# Patient Record
Sex: FEMALE | Race: WHITE | Employment: UNEMPLOYED | URBAN - METROPOLITAN AREA
[De-identification: names, ages, dates, MRNs, and addresses within clinical notes are randomized per-mention and may not be internally consistent; named-entity substitution may affect disease eponyms.]

---

## 2018-09-21 ENCOUNTER — OFFICE VISIT (OUTPATIENT)
Dept: FAMILY MEDICINE CLINIC | Age: 38
End: 2018-09-21

## 2018-09-21 VITALS
HEART RATE: 110 BPM | SYSTOLIC BLOOD PRESSURE: 104 MMHG | BODY MASS INDEX: 35.71 KG/M2 | TEMPERATURE: 98.3 F | OXYGEN SATURATION: 98 % | HEIGHT: 66 IN | DIASTOLIC BLOOD PRESSURE: 72 MMHG | WEIGHT: 222.2 LBS

## 2018-09-21 DIAGNOSIS — Z23 NEED FOR DIPHTHERIA-TETANUS-PERTUSSIS (TDAP) VACCINE: ICD-10-CM

## 2018-09-21 DIAGNOSIS — N89.8 VAGINAL IRRITATION: ICD-10-CM

## 2018-09-21 DIAGNOSIS — L65.8 FEMALE PATTERN HAIR LOSS: ICD-10-CM

## 2018-09-21 DIAGNOSIS — Z11.4 SCREENING FOR HIV (HUMAN IMMUNODEFICIENCY VIRUS): ICD-10-CM

## 2018-09-21 DIAGNOSIS — Z13.220 SCREENING CHOLESTEROL LEVEL: ICD-10-CM

## 2018-09-21 DIAGNOSIS — E11.65 UNCONTROLLED TYPE 2 DIABETES MELLITUS WITH HYPERGLYCEMIA, WITHOUT LONG-TERM CURRENT USE OF INSULIN (HCC): Primary | ICD-10-CM

## 2018-09-21 LAB
CREATININE URINE POCT: NORMAL
HBA1C MFR BLD: 12.1 %
MICROALBUMIN/CREAT 24H UR: NORMAL MG/G{CREAT}
MICROALBUMIN/CREAT UR-RTO: NORMAL

## 2018-09-21 PROCEDURE — 99204 OFFICE O/P NEW MOD 45 MIN: CPT | Performed by: NURSE PRACTITIONER

## 2018-09-21 PROCEDURE — 83036 HEMOGLOBIN GLYCOSYLATED A1C: CPT | Performed by: NURSE PRACTITIONER

## 2018-09-21 PROCEDURE — 82044 UR ALBUMIN SEMIQUANTITATIVE: CPT | Performed by: NURSE PRACTITIONER

## 2018-09-21 RX ORDER — LANCETS 30 GAUGE
EACH MISCELLANEOUS
Qty: 100 EACH | Refills: 3 | Status: SHIPPED | OUTPATIENT
Start: 2018-09-21

## 2018-09-21 RX ORDER — BLOOD-GLUCOSE METER
1 KIT MISCELLANEOUS DAILY
Qty: 1 KIT | Refills: 0 | Status: SHIPPED | OUTPATIENT
Start: 2018-09-21 | End: 2018-10-03 | Stop reason: CLARIF

## 2018-09-21 RX ORDER — GLUCOSAMINE HCL/CHONDROITIN SU 500-400 MG
CAPSULE ORAL
Qty: 100 STRIP | Refills: 3 | Status: SHIPPED | OUTPATIENT
Start: 2018-09-21

## 2018-09-21 ASSESSMENT — PATIENT HEALTH QUESTIONNAIRE - PHQ9
SUM OF ALL RESPONSES TO PHQ9 QUESTIONS 1 & 2: 2
2. FEELING DOWN, DEPRESSED OR HOPELESS: 1
SUM OF ALL RESPONSES TO PHQ QUESTIONS 1-9: 2
1. LITTLE INTEREST OR PLEASURE IN DOING THINGS: 1
SUM OF ALL RESPONSES TO PHQ QUESTIONS 1-9: 2

## 2018-09-21 ASSESSMENT — ENCOUNTER SYMPTOMS
CHEST TIGHTNESS: 0
SHORTNESS OF BREATH: 0
ABDOMINAL PAIN: 0
BLURRED VISION: 0
COUGH: 0
WHEEZING: 0

## 2018-09-21 NOTE — PROGRESS NOTES
problem. The current episode started more than 1 month ago. The problem occurs intermittently. The problem has been waxing and waning. Associated symptoms include headaches. Pertinent negatives include no abdominal pain or fever. The vaginal discharge was milky. The symptoms are aggravated by intercourse. She has tried antifungals for the symptoms. The treatment provided mild relief. She is sexually active. She uses condoms and spermicide for contraception. Past Medical History:   Diagnosis Date    Diabetes mellitus ([facility] [address]     Headache        Current Outpatient Prescriptions on File Prior to Visit   Medication Sig Dispense Refill    acetaminophen (TYLENOL) 500 MG tablet Take 1,000 mg by mouth every 6 hours as needed.  ibuprofen (ADVIL;MOTRIN) 400 MG tablet Take 400 mg by mouth every 6 hours as needed. No current facility-administered medications on file prior to visit. Past Medical, Family, and Social History have been reviewed today. Review of Systems   Constitutional: Positive for fatigue. Negative for fever. Eyes: Negative for blurred vision. Respiratory: Negative for cough, chest tightness, shortness of breath and wheezing. Cardiovascular: Negative for chest pain and palpitations. Gastrointestinal: Negative for abdominal pain. Genitourinary: Positive for vaginal discharge. Neurological: Positive for weakness and headaches. OBJECTIVE:     /72   Pulse 110   Temp 98.3 °F (36.8 °C) (Oral)   Ht 5' 5.5\" (1.664 m)   Wt 222 lb 3.2 oz (100.8 kg)   LMP 09/03/2018 (Approximate)   SpO2 98%   Breastfeeding? No   BMI 36.41 kg/m²     Physical Exam   Constitutional: She is oriented to person, place, and time. She appears well-developed and well-nourished. HENT:   Head: Normocephalic. Cardiovascular: Normal rate, regular rhythm and normal heart sounds.     Pulmonary/Chest: Effort normal and breath sounds normal.   Neurological: She is alert and oriented to person, place, and time. Skin: Skin is warm and dry. No results found for requested labs within last 30 days. Hemoglobin A1C (%)   Date Value   09/21/2018 12.1       Lab Results   Component Value Date    WBC 9.6 04/19/2018    HGB 13.6 04/19/2018    HCT 41.6 04/19/2018    MCV 90.4 04/19/2018     04/19/2018    SEGSABS 6.9 04/19/2018    LYMPHSABS 2.0 04/19/2018    MONOSABS 0.5 04/19/2018    EOSABS 0.1 04/19/2018    BASOSABS 0.1 04/19/2018     No results found for: TSH, TSHHS  Lab Results   Component Value Date    LABALBU 4.0 04/19/2018    BILITOT 0.3 04/19/2018    AST 21 04/19/2018    ALT 33 04/19/2018    ALKPHOS 76 04/19/2018             Results for orders placed or performed in visit on 09/21/18   POCT microalbumin   Result Value Ref Range    Microalb, Ur 10 mg/L     Creatinine Ur POCT 100 mg/dL     Microalbumin Creatinine Ratio <30 mg/g    POCT glycosylated hemoglobin (Hb A1C)   Result Value Ref Range    Hemoglobin A1C 12.1 %       ASSESSMENT AND PLAN:     1. Uncontrolled type 2 diabetes mellitus with hyperglycemia, without long-term current use of insulin ([address]  Diabetes education provided today:    Metabolic syndrome: association of diabetes with dyslipidemia, HTN and obesity. Nutrition as a mainstream of diabetes therapy. [place] about label reading. Carbs: good carbs and bad carbs, importance of carb counting, incorporation of protein with each meal to reduce Glycemic index, importance of portions, Carb/insulin ratio. Fats: Good fats and bad fats, meal planning and supplements. Physical activity: advised to exercise 5-7 days a week 30-60 mins at least. Discussed how it affects BS readings. Different diabetic medications. - POCT microalbumin  - POCT glycosylated hemoglobin (Hb A1C)  - glucose monitoring kit (FREESTYLE) monitoring kit; 1 kit by Does not apply route daily  Dispense: 1 kit; Refill: 0  - blood glucose monitor strips; Check blood glucose AC and HS  Dispense: 100 strip;  Refill: 3  - Lancets MISC; Check blood glucose AC and HS  Dispense: 100 each; Refill: 3  - COMPREHENSIVE METABOLIC PANEL; Future  - CBC WITH AUTO DIFFERENTIAL; Future  - metFORMIN (GLUCOPHAGE) 500 MG tablet; Take 1 tablet by mouth 2 times daily (with meals)  Dispense: 60 tablet; Refill: 0  - insulin glargine (BASAGLAR KWIKPEN) 100 UNIT/ML injection pen; Inject 10 Units into the skin nightly  Dispense: 5 pen; Refill: 3    Start metformin-advised on side effects and their management. Start low dose insulin glargine, taper up gradually. Referral placed to endocrinology-pt has previously seen Dr Bernice Huynh. Pt needs referral to podiatry and opthalmology     2. Female pattern hair loss  - DHEA-SULFATE; Future  - TSH with Reflex; Future  - FERRITIN; Future  - Testosterone, free, total; Future    3. Vaginal irritation  - Advised on use of lubricants, spermicides, condoms.  - Recommended RepHresh products  - Check hormone levels. Suspect vaginal irritation r/t DM  - Complete pelvic at next visit  - Halley Gray; Future    4. Screening cholesterol level  - LIPID PANEL; Future    5. Screening for HIV (human immunodeficiency virus)  - HIV-1 AND HIV-2 ANTIBODIES; Future    6. Need for diphtheria-tetanus-pertussis (Tdap) vaccine  - Declined today      Return in about 1 week (around 9/28/2018) for Medication Re-Evaluation, Lab Results. Care discussed with patient. Patient educated on signs and symptoms of exacerbation and when to seek further medical attention. Advised to call for any problems, questions, or concerns. Patient verbalizes understanding and agrees with plan. Medications reviewed and reconciled. Continue current medications. Appropriate prescriptions are ordered. Risks and benefits of meds are discussed. After visit summary provided. The patients records were reviewed and discussed. Time was given for questions . All questions were answered to the patients satisfaction.  A total time of 45 was spent with at least

## 2018-09-21 NOTE — PATIENT INSTRUCTIONS
cup winter squash; ½ of a small baked potato; ½ cup of cooked beans; or ½ cup cooked corn or green peas. · Learn how much carbs to eat each day and at each meal. A dietitian or CDE can teach you how to keep track of the amount of carbs you eat. This is called carbohydrate counting. · If you are not sure how to count carbohydrate grams, use the Plate Method to plan meals. It is a good, quick way to make sure that you have a balanced meal. It also helps you spread carbs throughout the day. ¨ Divide your plate by types of foods. Put non-starchy vegetables on half the plate, meat or other protein food on one-quarter of the plate, and a grain or starchy vegetable in the final quarter of the plate. To this you can add a small piece of fruit and 1 cup of milk or yogurt, depending on how many carbs you are supposed to eat at a meal.  · Try to eat about the same amount of carbs at each meal. Do not \"save up\" your daily allowance of carbs to eat at one meal.  · Proteins have very little or no carbs per serving. Examples of proteins are beef, chicken, turkey, fish, eggs, tofu, cheese, cottage cheese, and peanut butter. A serving size of meat is 3 ounces, which is about the size of a deck of cards. Examples of meat substitute serving sizes (equal to 1 ounce of meat) are 1/4 cup of cottage cheese, 1 egg, 1 tablespoon of peanut butter, and ½ cup of tofu. How can you eat out and still eat healthy? · Learn to estimate the serving sizes of foods that have carbohydrate. If you measure food at home, it will be easier to estimate the amount in a serving of restaurant food. · If the meal you order has too much carbohydrate (such as potatoes, corn, or baked beans), ask to have a low-carbohydrate food instead. Ask for a salad or green vegetables. · If you use insulin, check your blood sugar before and after eating out to help you plan how much to eat in the future.   · If you eat more carbohydrate at a meal than you had planned, warranty or liability for your use of this information. Patient Education            Current as of: December 7, 2017  Content Version: 11.7  © 0384-5584 [employer]. Care instructions adapted under license by [employer] [facility] ([facility]). If you have questions about a medical condition or this instruction, always ask your healthcare professional. [address] any warranty or liability for your use of this information. Patient Education            Current as of: October 5, 2017  Content Version: 11.7  © 2006-2018 [employer]. Care instructions adapted under license by [employer] [facility] ([facility]). If you have questions about a medical condition or this instruction, always ask your healthcare professional. [address] any warranty or liability for your use of this information.

## 2018-10-01 ENCOUNTER — HOSPITAL ENCOUNTER (OUTPATIENT)
Age: 38
Discharge: HOME | End: 2018-10-01
Payer: MEDICARE

## 2018-10-01 DIAGNOSIS — Z11.4 SCREENING FOR HIV (HUMAN IMMUNODEFICIENCY VIRUS): ICD-10-CM

## 2018-10-01 DIAGNOSIS — Z13.220 SCREENING CHOLESTEROL LEVEL: ICD-10-CM

## 2018-10-01 DIAGNOSIS — L65.8 FEMALE PATTERN HAIR LOSS: ICD-10-CM

## 2018-10-01 DIAGNOSIS — E11.65 UNCONTROLLED TYPE 2 DIABETES MELLITUS WITH HYPERGLYCEMIA, WITHOUT LONG-TERM CURRENT USE OF INSULIN (HCC): ICD-10-CM

## 2018-10-01 DIAGNOSIS — N89.8 VAGINAL IRRITATION: ICD-10-CM

## 2018-10-01 LAB
ALBUMIN SERPL-MCNC: 4.5 GM/DL (ref 3.4–5)
ALP BLD-CCNC: 93 IU/L (ref 40–128)
ALT SERPL-CCNC: 37 U/L (ref 10–40)
ANION GAP SERPL CALCULATED.3IONS-SCNC: 18 MMOL/L (ref 4–16)
AST SERPL-CCNC: 26 IU/L (ref 15–37)
BASOPHILS ABSOLUTE: 0.1 K/CU MM
BASOPHILS RELATIVE PERCENT: 0.5 % (ref 0–1)
BILIRUB SERPL-MCNC: 0.4 MG/DL (ref 0–1)
BUN BLDV-MCNC: 11 MG/DL (ref 6–23)
CALCIUM SERPL-MCNC: 9.6 MG/DL (ref 8.3–10.6)
CHLORIDE BLD-SCNC: 94 MMOL/L (ref 99–110)
CHOLESTEROL: 274 MG/DL
CO2: 21 MMOL/L (ref 21–32)
CREAT SERPL-MCNC: 0.6 MG/DL (ref 0.6–1.1)
DIFFERENTIAL TYPE: ABNORMAL
EOSINOPHILS ABSOLUTE: 0.2 K/CU MM
EOSINOPHILS RELATIVE PERCENT: 1.5 % (ref 0–3)
FERRITIN: 226 NG/ML (ref 15–150)
GFR AFRICAN AMERICAN: >60 ML/MIN/1.73M2
GFR NON-AFRICAN AMERICAN: >60 ML/MIN/1.73M2
GLUCOSE BLD-MCNC: 309 MG/DL (ref 70–99)
HCT VFR BLD CALC: 45.4 % (ref 37–47)
HDLC SERPL-MCNC: 52 MG/DL
HEMOGLOBIN: 14.6 GM/DL (ref 12.5–16)
IMMATURE NEUTROPHIL %: 0.3 % (ref 0–0.43)
LDL CHOLESTEROL DIRECT: 213 MG/DL
LYMPHOCYTES ABSOLUTE: 2.8 K/CU MM
LYMPHOCYTES RELATIVE PERCENT: 27.1 % (ref 24–44)
MCH RBC QN AUTO: 29.4 PG (ref 27–31)
MCHC RBC AUTO-ENTMCNC: 32.2 % (ref 32–36)
MCV RBC AUTO: 91.3 FL (ref 78–100)
MONOCYTES ABSOLUTE: 0.8 K/CU MM
MONOCYTES RELATIVE PERCENT: 7.5 % (ref 0–4)
NUCLEATED RBC %: 0 %
PDW BLD-RTO: 13 % (ref 11.7–14.9)
PLATELET # BLD: 334 K/CU MM (ref 140–440)
PMV BLD AUTO: 10.8 FL (ref 7.5–11.1)
POTASSIUM SERPL-SCNC: 4.9 MMOL/L (ref 3.5–5.1)
RBC # BLD: 4.97 M/CU MM (ref 4.2–5.4)
SEGMENTED NEUTROPHILS ABSOLUTE COUNT: 6.5 K/CU MM
SEGMENTED NEUTROPHILS RELATIVE PERCENT: 63.1 % (ref 36–66)
SODIUM BLD-SCNC: 133 MMOL/L (ref 135–145)
TOTAL IMMATURE NEUTOROPHIL: 0.03 K/CU MM
TOTAL NUCLEATED RBC: 0 K/CU MM
TOTAL PROTEIN: 7.4 GM/DL (ref 6.4–8.2)
TRIGL SERPL-MCNC: 142 MG/DL
TSH HIGH SENSITIVITY: 2.1 UIU/ML (ref 0.27–4.2)
WBC # BLD: 10.3 K/CU MM (ref 4–10.5)

## 2018-10-01 PROCEDURE — 80050 GENERAL HEALTH PANEL: CPT

## 2018-10-01 PROCEDURE — 36415 COLL VENOUS BLD VENIPUNCTURE: CPT

## 2018-10-01 PROCEDURE — 82728 ASSAY OF FERRITIN: CPT

## 2018-10-01 PROCEDURE — 83721 ASSAY OF BLOOD LIPOPROTEIN: CPT

## 2018-10-01 PROCEDURE — 80061 LIPID PANEL: CPT

## 2018-10-01 PROCEDURE — 82627 DEHYDROEPIANDROSTERONE: CPT

## 2018-10-03 ENCOUNTER — OFFICE VISIT (OUTPATIENT)
Dept: FAMILY MEDICINE CLINIC | Age: 38
End: 2018-10-03
Payer: MEDICARE

## 2018-10-03 VITALS
HEART RATE: 96 BPM | WEIGHT: 223 LBS | OXYGEN SATURATION: 99 % | SYSTOLIC BLOOD PRESSURE: 128 MMHG | BODY MASS INDEX: 36.54 KG/M2 | DIASTOLIC BLOOD PRESSURE: 72 MMHG

## 2018-10-03 DIAGNOSIS — N76.1 CHRONIC VAGINITIS: ICD-10-CM

## 2018-10-03 DIAGNOSIS — R79.89 ELEVATED DHEA: ICD-10-CM

## 2018-10-03 DIAGNOSIS — R79.89 ELEVATED FERRITIN: ICD-10-CM

## 2018-10-03 DIAGNOSIS — L65.8 FEMALE PATTERN HAIR LOSS: ICD-10-CM

## 2018-10-03 DIAGNOSIS — E13.65 OTHER SPECIFIED DIABETES MELLITUS WITH HYPERGLYCEMIA, WITHOUT LONG-TERM CURRENT USE OF INSULIN (HCC): Primary | ICD-10-CM

## 2018-10-03 DIAGNOSIS — L68.0 HIRSUTISM: ICD-10-CM

## 2018-10-03 DIAGNOSIS — E78.2 MIXED HYPERLIPIDEMIA: ICD-10-CM

## 2018-10-03 DIAGNOSIS — N91.5 OLIGOMENORRHEA, UNSPECIFIED TYPE: ICD-10-CM

## 2018-10-03 LAB — DHEAS (DHEA SULFATE): 282

## 2018-10-03 PROCEDURE — 99215 OFFICE O/P EST HI 40 MIN: CPT | Performed by: NURSE PRACTITIONER

## 2018-10-03 PROCEDURE — G8427 DOCREV CUR MEDS BY ELIG CLIN: HCPCS | Performed by: NURSE PRACTITIONER

## 2018-10-03 PROCEDURE — G8484 FLU IMMUNIZE NO ADMIN: HCPCS | Performed by: NURSE PRACTITIONER

## 2018-10-03 PROCEDURE — G8417 CALC BMI ABV UP PARAM F/U: HCPCS | Performed by: NURSE PRACTITIONER

## 2018-10-03 PROCEDURE — 1036F TOBACCO NON-USER: CPT | Performed by: NURSE PRACTITIONER

## 2018-10-03 PROCEDURE — 3046F HEMOGLOBIN A1C LEVEL >9.0%: CPT | Performed by: NURSE PRACTITIONER

## 2018-10-03 PROCEDURE — 2022F DILAT RTA XM EVC RTNOPTHY: CPT | Performed by: NURSE PRACTITIONER

## 2018-10-03 RX ORDER — FLUCONAZOLE 150 MG/1
150 TABLET ORAL ONCE
Qty: 1 TABLET | Refills: 0 | Status: SHIPPED | OUTPATIENT
Start: 2018-10-03 | End: 2018-10-03

## 2018-10-03 RX ORDER — ATORVASTATIN CALCIUM 20 MG/1
20 TABLET, FILM COATED ORAL DAILY
Qty: 30 TABLET | Refills: 1 | Status: SHIPPED | OUTPATIENT
Start: 2018-10-03 | End: 2018-12-03 | Stop reason: SDUPTHER

## 2018-10-03 NOTE — PROGRESS NOTES
nervous about starting this. Will proceed with initial 10 U at bedtime and increase to 15 U in 3 days and then increase to 20 U in another 3 days. Will have her continue with this dose and keep track of fasting blood sugars. Will likely need continued increases but will defer to endo or PCP to titrate. If no sig improvement in blood sugars pre-meal and 2 hr PP then recommend mealtime insulin + basaglar. With such a high a1c her pancreas is likely stunned. Has been on statin in the past for diabetes. Given significant elevated LDL will start on lipitor 20mg daily and should have recheck with PCP in 6-8wks. Discuss low dose lisinopril for kidney protection with PCP. 3. I think elevated ferritin is more likely to be acute phase reactant secondary to inflammatory condition I.e.diabetes rather than hemochromatosis given her LFTs are normal. If there is further concern then would recommend repeating iron studies and LFTs. 4. Diflucan sent. If symptoms persist then would obtain vaginal culture and consider STI testing. We discussed that vaginal symptoms should improve as better blood sugar control is achieved. 5. TSH WNL. May be stress induced or related to inflammation. Can trial biotin.   6-8. Likely PCOS. Metformin already prescribed. Consider referral to OBGYN if symptoms are bothersome and/or pt desires to get pregnant. Establish with BILLIE Mcallister on 11/16. Follow-up here PRN. Pt voices understanding and is agreeable to plan.      Current Outpatient Prescriptions   Medication Sig Dispense Refill    Blood Glucose Monitoring Suppl (TRUE METRIX METER) w/Device KIT 1 KIT BY DOES NOT APPLY ROUTE DAILY  0    atorvastatin (LIPITOR) 20 MG tablet Take 1 tablet by mouth daily 30 tablet 1    blood glucose monitor strips Check blood glucose AC and  strip 3    Lancets MISC Check blood glucose AC and  each 3    Insulin Pen Needle 31G X 5 MM MISC 1 each by Does not apply route daily 100 each 3   

## 2018-10-19 ASSESSMENT — ENCOUNTER SYMPTOMS
NAUSEA: 0
EYES NEGATIVE: 1
VOMITING: 0
DIARRHEA: 0
ABDOMINAL PAIN: 0
RESPIRATORY NEGATIVE: 1

## 2018-11-09 ENCOUNTER — TELEPHONE (OUTPATIENT)
Dept: FAMILY MEDICINE CLINIC | Age: 38
End: 2018-11-09

## 2018-11-09 DIAGNOSIS — N89.8 VAGINAL DISCHARGE: Primary | ICD-10-CM

## 2018-11-09 RX ORDER — FLUCONAZOLE 150 MG/1
150 TABLET ORAL ONCE
Qty: 1 TABLET | Refills: 0 | Status: SHIPPED | OUTPATIENT
Start: 2018-11-09 | End: 2018-11-09

## 2018-11-16 ENCOUNTER — OFFICE VISIT (OUTPATIENT)
Dept: FAMILY MEDICINE CLINIC | Age: 38
End: 2018-11-16
Payer: MEDICARE

## 2018-11-16 VITALS
RESPIRATION RATE: 17 BRPM | HEIGHT: 65 IN | OXYGEN SATURATION: 98 % | SYSTOLIC BLOOD PRESSURE: 126 MMHG | BODY MASS INDEX: 38.05 KG/M2 | DIASTOLIC BLOOD PRESSURE: 80 MMHG | WEIGHT: 228.4 LBS | HEART RATE: 89 BPM

## 2018-11-16 DIAGNOSIS — F32.A DEPRESSION, UNSPECIFIED DEPRESSION TYPE: ICD-10-CM

## 2018-11-16 DIAGNOSIS — Z79.4 TYPE 2 DIABETES MELLITUS WITH COMPLICATION, WITH LONG-TERM CURRENT USE OF INSULIN (HCC): Primary | ICD-10-CM

## 2018-11-16 DIAGNOSIS — E78.5 HYPERLIPIDEMIA, UNSPECIFIED HYPERLIPIDEMIA TYPE: ICD-10-CM

## 2018-11-16 DIAGNOSIS — E11.8 TYPE 2 DIABETES MELLITUS WITH COMPLICATION, WITH LONG-TERM CURRENT USE OF INSULIN (HCC): Primary | ICD-10-CM

## 2018-11-16 DIAGNOSIS — E66.01 CLASS 2 SEVERE OBESITY DUE TO EXCESS CALORIES WITH SERIOUS COMORBIDITY AND BODY MASS INDEX (BMI) OF 38.0 TO 38.9 IN ADULT (HCC): ICD-10-CM

## 2018-11-16 PROCEDURE — 1036F TOBACCO NON-USER: CPT | Performed by: NURSE PRACTITIONER

## 2018-11-16 PROCEDURE — G8417 CALC BMI ABV UP PARAM F/U: HCPCS | Performed by: NURSE PRACTITIONER

## 2018-11-16 PROCEDURE — 99214 OFFICE O/P EST MOD 30 MIN: CPT | Performed by: NURSE PRACTITIONER

## 2018-11-16 PROCEDURE — 3046F HEMOGLOBIN A1C LEVEL >9.0%: CPT | Performed by: NURSE PRACTITIONER

## 2018-11-16 PROCEDURE — G8484 FLU IMMUNIZE NO ADMIN: HCPCS | Performed by: NURSE PRACTITIONER

## 2018-11-16 PROCEDURE — G8427 DOCREV CUR MEDS BY ELIG CLIN: HCPCS | Performed by: NURSE PRACTITIONER

## 2018-11-16 PROCEDURE — 2022F DILAT RTA XM EVC RTNOPTHY: CPT | Performed by: NURSE PRACTITIONER

## 2018-11-16 RX ORDER — INSULIN LISPRO 100 [IU]/ML
INJECTION, SOLUTION INTRAVENOUS; SUBCUTANEOUS
COMMUNITY
Start: 2018-11-15

## 2018-11-16 RX ORDER — SITAGLIPTIN 100 MG/1
100 TABLET, FILM COATED ORAL EVERY MORNING
COMMUNITY
Start: 2018-11-15 | End: 2022-01-20

## 2018-11-16 ASSESSMENT — ENCOUNTER SYMPTOMS
VOMITING: 0
ABDOMINAL DISTENTION: 0
BACK PAIN: 0
SHORTNESS OF BREATH: 0
NAUSEA: 0

## 2018-11-16 NOTE — PROGRESS NOTES
SUBJECTIVE:  Zuleyma Fried   1980   female   No Known Allergies    Chief Complaint   Patient presents with    Diabetes    Alopecia    Vaginal Pain      HPI   New patient to establish PCP  She was seen by me over 4 years ago, then lost her insurance for a while and did not seek medical attention. She has been seen recently at the [facility], and was referred to [facility] who she has already seen. She recently restarted her medication including insulin. Reports that she does not check her blood sugar routinely, but it has been in the 200's. She is concerned with thinning hair. Past Medical History:   Diagnosis Date    Diabetes mellitus ([facility] [address]     Headache     Hyperlipidemia      Social History     Social History    Marital status: Single     Spouse name: N/A    Number of children: N/A    Years of education: N/A     Occupational History    Not on file. Social History Main Topics    Smoking status: Never Smoker    Smokeless tobacco: Never Used    Alcohol use Yes      Comment: occasionally    Drug use: No      Comment: occasionally    Sexual activity: Yes     Partners: Male     Other Topics Concern    Not on file     Social History Narrative    No narrative on file     History reviewed. No pertinent family history. History reviewed. No pertinent surgical history. Review of Systems   Constitutional: Negative for fatigue and unexpected weight change. HENT: Negative for congestion. Respiratory: Negative for shortness of breath. Cardiovascular: Negative for chest pain, palpitations and leg swelling. Gastrointestinal: Negative for abdominal distention, nausea and vomiting. Musculoskeletal: Negative for back pain and gait problem. Skin:        Thinning hair   Neurological: Negative for dizziness, weakness and headaches. Psychiatric/Behavioral: Positive for dysphoric mood. Negative for agitation and sleep disturbance. The patient is not nervous/anxious.         OBJECTIVE:  /80 (Site: Left Upper Arm, Position: Sitting, Cuff Size: Large Adult)   Pulse 89   Resp 17   Ht 5' 5\" (1.651 m)   Wt 228 lb 6.4 oz (103.6 kg)   LMP 11/09/2018 (Approximate)   SpO2 98%   BMI 38.01 kg/m²   BP Readings from Last 3 Encounters:   11/16/18 126/80   10/03/18 128/72   09/21/18 104/72     Wt Readings from Last 3 Encounters:   11/16/18 228 lb 6.4 oz (103.6 kg)   10/03/18 223 lb (101.2 kg)   09/21/18 222 lb 3.2 oz (100.8 kg)     Body mass index is 38.01 kg/m². Physical Exam   Constitutional: She is oriented to person, place, and time. She appears well-developed. No distress. Obese with a BMI of 38.01   HENT:   Head: Normocephalic and atraumatic. Right Ear: External ear normal.   Left Ear: External ear normal.   Nose: Nose normal.   Mouth/Throat: Oropharynx is clear and moist.   Eyes: Pupils are equal, round, and reactive to light. Conjunctivae are normal.   Neck: Normal range of motion. Neck supple. Cardiovascular: Normal rate, regular rhythm, normal heart sounds and intact distal pulses. Pulmonary/Chest: Effort normal and breath sounds normal.   Abdominal: Soft. Bowel sounds are normal.   Musculoskeletal: Normal range of motion. Neurological: She is alert and oriented to person, place, and time. She has normal reflexes. Skin: Skin is warm and dry. Psychiatric: She has a normal mood and affect. Her behavior is normal. Judgment and thought content normal.   Nursing note and vitals reviewed. ASSESSMENT/PLAN:    1. Depression, unspecified depression type  - External Referral To Psychology    2. Class 2 severe obesity due to excess calories with serious comorbidity and body mass index (BMI) of 38.0 to 38.9 in [facility])  Increase routine exercise as tolerated  Healthy food choices, portion control    3.  Type 2 diabetes mellitus with complication, with long-term current use of insulin ([address]  Follow up with Dr Catrina Rutherford as scheduled  Refill;  - HUMALOG KWIKPEN 100 UNIT/ML pen;   - JANUVIA 100

## 2018-11-18 ASSESSMENT — ENCOUNTER SYMPTOMS: ROS SKIN COMMENTS: THINNING HAIR

## 2018-12-03 DIAGNOSIS — E78.2 MIXED HYPERLIPIDEMIA: ICD-10-CM

## 2018-12-03 RX ORDER — ATORVASTATIN CALCIUM 20 MG/1
TABLET, FILM COATED ORAL
Qty: 30 TABLET | Refills: 1 | Status: SHIPPED | OUTPATIENT
Start: 2018-12-03 | End: 2019-04-02 | Stop reason: SDUPTHER

## 2019-01-15 ENCOUNTER — OFFICE VISIT (OUTPATIENT)
Dept: FAMILY MEDICINE CLINIC | Age: 39
End: 2019-01-15
Payer: MEDICARE

## 2019-01-15 VITALS
WEIGHT: 231.6 LBS | RESPIRATION RATE: 17 BRPM | DIASTOLIC BLOOD PRESSURE: 78 MMHG | SYSTOLIC BLOOD PRESSURE: 122 MMHG | HEIGHT: 65 IN | HEART RATE: 120 BPM | BODY MASS INDEX: 38.59 KG/M2 | OXYGEN SATURATION: 97 %

## 2019-01-15 DIAGNOSIS — Z72.51 HISTORY OF UNPROTECTED SEX: ICD-10-CM

## 2019-01-15 DIAGNOSIS — E11.9 TYPE 2 DIABETES MELLITUS WITHOUT COMPLICATION, WITH LONG-TERM CURRENT USE OF INSULIN (HCC): ICD-10-CM

## 2019-01-15 DIAGNOSIS — Z12.4 SCREENING FOR CERVICAL CANCER: ICD-10-CM

## 2019-01-15 DIAGNOSIS — Z00.00 PREVENTATIVE HEALTH CARE: Primary | ICD-10-CM

## 2019-01-15 DIAGNOSIS — N64.59 ABNORMAL BREAST FINDING: ICD-10-CM

## 2019-01-15 DIAGNOSIS — Z79.4 TYPE 2 DIABETES MELLITUS WITHOUT COMPLICATION, WITH LONG-TERM CURRENT USE OF INSULIN (HCC): ICD-10-CM

## 2019-01-15 LAB
CONTROL: NORMAL
PREGNANCY TEST URINE, POC: NORMAL

## 2019-01-15 PROCEDURE — 81025 URINE PREGNANCY TEST: CPT | Performed by: NURSE PRACTITIONER

## 2019-01-15 PROCEDURE — G8484 FLU IMMUNIZE NO ADMIN: HCPCS | Performed by: NURSE PRACTITIONER

## 2019-01-15 PROCEDURE — 99395 PREV VISIT EST AGE 18-39: CPT | Performed by: NURSE PRACTITIONER

## 2019-01-15 ASSESSMENT — ENCOUNTER SYMPTOMS
SHORTNESS OF BREATH: 0
ABDOMINAL DISTENTION: 0
BACK PAIN: 0
VOMITING: 0
NAUSEA: 0

## 2019-01-16 LAB
CANDIDA SPECIES, DNA PROBE: NORMAL
GARDNERELLA VAGINALIS, DNA PROBE: NORMAL
TRICHOMONAS VAGINALIS DNA: NORMAL

## 2019-01-17 LAB
C. TRACHOMATIS DNA,THIN PREP: NEGATIVE
N. GONORRHOEAE DNA, THIN PREP: NEGATIVE

## 2019-01-21 ENCOUNTER — HOSPITAL ENCOUNTER (OUTPATIENT)
Dept: ULTRASOUND IMAGING | Age: 39
End: 2019-01-21
Payer: MEDICARE

## 2019-01-21 ENCOUNTER — HOSPITAL ENCOUNTER (OUTPATIENT)
Dept: WOMENS IMAGING | Age: 39
Discharge: HOME | End: 2019-01-21
Payer: MEDICARE

## 2019-01-21 DIAGNOSIS — N64.59 ABNORMAL BREAST FINDING: ICD-10-CM

## 2019-01-21 PROCEDURE — G0279 TOMOSYNTHESIS, MAMMO: HCPCS

## 2019-03-12 ENCOUNTER — OFFICE VISIT (OUTPATIENT)
Dept: FAMILY MEDICINE CLINIC | Age: 39
End: 2019-03-12
Payer: MEDICARE

## 2019-03-12 VITALS
OXYGEN SATURATION: 98 % | HEART RATE: 88 BPM | BODY MASS INDEX: 38.92 KG/M2 | DIASTOLIC BLOOD PRESSURE: 74 MMHG | TEMPERATURE: 98.7 F | WEIGHT: 233.6 LBS | SYSTOLIC BLOOD PRESSURE: 114 MMHG | HEIGHT: 65 IN

## 2019-03-12 DIAGNOSIS — R10.84 GENERALIZED ABDOMINAL PAIN: Primary | ICD-10-CM

## 2019-03-12 DIAGNOSIS — R19.7 DIARRHEA, UNSPECIFIED TYPE: ICD-10-CM

## 2019-03-12 LAB
BILIRUBIN, POC: NORMAL
BLOOD URINE, POC: NORMAL
CLARITY, POC: CLEAR
COLOR, POC: YELLOW
GLUCOSE URINE, POC: NORMAL
KETONES, POC: NORMAL
LEUKOCYTE EST, POC: NORMAL
NITRITE, POC: NORMAL
PH, POC: 6.5
PROTEIN, POC: NORMAL
SPECIFIC GRAVITY, POC: 1.02
UROBILINOGEN, POC: 0.2

## 2019-03-12 PROCEDURE — 1036F TOBACCO NON-USER: CPT | Performed by: NURSE PRACTITIONER

## 2019-03-12 PROCEDURE — G8484 FLU IMMUNIZE NO ADMIN: HCPCS | Performed by: NURSE PRACTITIONER

## 2019-03-12 PROCEDURE — G8427 DOCREV CUR MEDS BY ELIG CLIN: HCPCS | Performed by: NURSE PRACTITIONER

## 2019-03-12 PROCEDURE — 99214 OFFICE O/P EST MOD 30 MIN: CPT | Performed by: NURSE PRACTITIONER

## 2019-03-12 PROCEDURE — G8417 CALC BMI ABV UP PARAM F/U: HCPCS | Performed by: NURSE PRACTITIONER

## 2019-03-12 RX ORDER — DICYCLOMINE HYDROCHLORIDE 10 MG/1
10 CAPSULE ORAL 3 TIMES DAILY PRN
Qty: 20 CAPSULE | Refills: 0 | Status: SHIPPED | OUTPATIENT
Start: 2019-03-12 | End: 2019-07-10 | Stop reason: ALTCHOICE

## 2019-03-12 ASSESSMENT — PATIENT HEALTH QUESTIONNAIRE - PHQ9
2. FEELING DOWN, DEPRESSED OR HOPELESS: 1
1. LITTLE INTEREST OR PLEASURE IN DOING THINGS: 1
SUM OF ALL RESPONSES TO PHQ QUESTIONS 1-9: 2
SUM OF ALL RESPONSES TO PHQ9 QUESTIONS 1 & 2: 2
SUM OF ALL RESPONSES TO PHQ QUESTIONS 1-9: 2

## 2019-03-12 ASSESSMENT — ENCOUNTER SYMPTOMS
DIARRHEA: 1
FLATUS: 0
BELCHING: 0
NAUSEA: 1
HEMATOCHEZIA: 0
ABDOMINAL PAIN: 1
VOMITING: 0
CONSTIPATION: 0

## 2019-03-12 ASSESSMENT — CROHNS DISEASE ACTIVITY INDEX (CDAI): CDAI SCORE: 0

## 2019-03-12 NOTE — PATIENT INSTRUCTIONS
Food diary to see if you can identify triggers. Can take bentyl as needed for stomach cramps. Blood work on Thursday to check liver and pancreatic function. Will call w/ results. Follow-up promptly for severe/worsening symptoms. Follow-up in 3 days if no improvement in symptoms.

## 2019-03-12 NOTE — PROGRESS NOTES
Verenice Gregory is a 41 y.o. female. Chief Complaint   Patient presents with    Abdominal Pain     x 2 weeks, now diarrhea/soft stools x 1 week 3-4 x daily        SUBJECTIVE:     Isha Samuels is an established pt of BILLIE Murphy who presents with abdominal pain x 2wks with associated diarrhea. Abdominal Pain   This is a new problem. The current episode started 1 to 4 weeks ago (x 2 wks). The onset quality is sudden. The problem occurs intermittently. The problem has been unchanged. The pain is located in the generalized abdominal region. The pain is moderate. The quality of the pain is cramping and sharp. The abdominal pain radiates to the periumbilical region. Associated symptoms include diarrhea and nausea. Pertinent negatives include no anorexia, arthralgias, belching, constipation, dysuria, fever, flatus, frequency, headaches, hematochezia, hematuria, melena, myalgias, vomiting or weight loss. The pain is aggravated by eating and palpation. The pain is relieved by nothing. She has tried nothing for the symptoms. There is no history of abdominal surgery, colon cancer, Crohn's disease, gallstones, GERD, irritable bowel syndrome, pancreatitis, PUD or ulcerative colitis. Had wings and wine around the time she first noticed the pain. Drinks a couple glasses of wine 1-2 days per week. Says she is generally not a big drinker. No prior hx of liver or gallbladder problems. Review of Systems   Constitutional: Negative for activity change, appetite change, chills, fever, unexpected weight change and weight loss. Respiratory: Negative. Cardiovascular: Negative. Gastrointestinal: Positive for abdominal pain, diarrhea and nausea. Negative for anorexia, constipation, flatus, hematochezia, melena and vomiting. Genitourinary: Negative for decreased urine volume, difficulty urinating, dysuria, frequency, hematuria, pelvic pain, urgency, vaginal bleeding and vaginal discharge.    Musculoskeletal: Negative for there are triggers.  -Will trial dicyclomine. Diarrhea, unspecified type        -If persists then stool culture. Will call patient with results of blood work and further instruction. Follow-up promptly for severe/worsening symptoms as discussed. Care discussed with patient. Patient educated on signs and symptoms of exacerbation and when to seek further medical attention. Advised to call for any problems, questions, or concerns. Patient verbalizes understanding and agrees with plan.      After visit summary provided.      Return if symptoms worsen or fail to improve. Current Outpatient Medications   Medication Sig Dispense Refill    dicyclomine (BENTYL) 10 MG capsule Take 1 capsule by mouth 3 times daily as needed (stomach cramps) 20 capsule 0    HUMALOG KWIKPEN 100 UNIT/ML pen       JANUVIA 100 MG tablet       Blood Glucose Monitoring Suppl (TRUE METRIX METER) w/Device KIT 1 KIT BY DOES NOT APPLY ROUTE DAILY  0    blood glucose monitor strips Check blood glucose AC and  strip 3    Lancets MISC Check blood glucose AC and  each 3    metFORMIN (GLUCOPHAGE) 500 MG tablet Take 1 tablet by mouth 2 times daily (with meals) (Patient taking differently: Take 500 mg by mouth 4 times daily Indications: pt reports taking 2 tabs in a.m., 2 tabs in p.m. ) 60 tablet 0    insulin glargine (BASAGLAR KWIKPEN) 100 UNIT/ML injection pen Inject 10 Units into the skin nightly 5 pen 3    Insulin Pen Needle 31G X 5 MM MISC 1 each by Does not apply route daily 100 each 3    acetaminophen (TYLENOL) 500 MG tablet Take 1,000 mg by mouth every 6 hours as needed.  ibuprofen (ADVIL;MOTRIN) 400 MG tablet Take 400 mg by mouth every 6 hours as needed.  atorvastatin (LIPITOR) 20 MG tablet Take 1 tablet by mouth daily 30 tablet 5     No current facility-administered medications for this visit. Patient Instructions   Food diary to see if you can identify triggers.     Can take bentyl as needed for stomach cramps. Blood work on Thursday to check liver and pancreatic function. Will call w/ results. Follow-up promptly for severe/worsening symptoms. Follow-up in 3 days if no improvement in symptoms.        ControlledSubstances Monitoring:

## 2019-03-18 ENCOUNTER — HOSPITAL ENCOUNTER (OUTPATIENT)
Age: 39
Discharge: HOME | End: 2019-03-18
Payer: MEDICARE

## 2019-03-18 LAB
ALBUMIN SERPL-MCNC: 4.4 GM/DL (ref 3.4–5)
ALBUMIN SERPL-MCNC: 4.4 GM/DL (ref 3.4–5)
ALP BLD-CCNC: 78 IU/L (ref 40–128)
ALP BLD-CCNC: 78 IU/L (ref 40–129)
ALT SERPL-CCNC: 72 U/L (ref 10–40)
ALT SERPL-CCNC: 72 U/L (ref 10–40)
ANION GAP SERPL CALCULATED.3IONS-SCNC: 14 MMOL/L (ref 4–16)
AST SERPL-CCNC: 68 IU/L (ref 15–37)
AST SERPL-CCNC: 68 IU/L (ref 15–37)
BASOPHILS ABSOLUTE: 0.1 K/CU MM
BASOPHILS RELATIVE PERCENT: 0.5 % (ref 0–1)
BILIRUB SERPL-MCNC: 0.3 MG/DL (ref 0–1)
BILIRUB SERPL-MCNC: 0.3 MG/DL (ref 0–1)
BILIRUBIN DIRECT: 0.2 MG/DL (ref 0–0.3)
BILIRUBIN, INDIRECT: 0.1 MG/DL (ref 0–0.7)
BUN BLDV-MCNC: 8 MG/DL (ref 6–23)
CALCIUM SERPL-MCNC: 9.4 MG/DL (ref 8.3–10.6)
CHLORIDE BLD-SCNC: 98 MMOL/L (ref 99–110)
CHOLESTEROL: 198 MG/DL
CO2: 22 MMOL/L (ref 21–32)
CREAT SERPL-MCNC: 0.6 MG/DL (ref 0.6–1.1)
CREATININE URINE: 76.9 MG/DL (ref 28–217)
DIFFERENTIAL TYPE: ABNORMAL
EOSINOPHILS ABSOLUTE: 0.2 K/CU MM
EOSINOPHILS RELATIVE PERCENT: 1.6 % (ref 0–3)
ESTIMATED AVERAGE GLUCOSE: 217 MG/DL
GFR AFRICAN AMERICAN: >60 ML/MIN/1.73M2
GFR NON-AFRICAN AMERICAN: >60 ML/MIN/1.73M2
GLUCOSE BLD-MCNC: 221 MG/DL (ref 70–99)
HBA1C MFR BLD: 9.2 % (ref 4.2–6.3)
HCT VFR BLD CALC: 43.3 % (ref 37–47)
HDLC SERPL-MCNC: 55 MG/DL
HEMOGLOBIN: 13.3 GM/DL (ref 12.5–16)
IMMATURE NEUTROPHIL %: 0.4 % (ref 0–0.43)
LDL CHOLESTEROL DIRECT: 160 MG/DL
LIPASE: 66 IU/L (ref 13–60)
LYMPHOCYTES ABSOLUTE: 2.6 K/CU MM
LYMPHOCYTES RELATIVE PERCENT: 27.6 % (ref 24–44)
MCH RBC QN AUTO: 28.6 PG (ref 27–31)
MCHC RBC AUTO-ENTMCNC: 30.7 % (ref 32–36)
MCV RBC AUTO: 93.1 FL (ref 78–100)
MICROALBUMIN/CREAT 24H UR: <1.2 MG/DL
MICROALBUMIN/CREAT UR-RTO: NORMAL MG/G CREAT (ref 0–30)
MONOCYTES ABSOLUTE: 0.5 K/CU MM
MONOCYTES RELATIVE PERCENT: 5.6 % (ref 0–4)
NUCLEATED RBC %: 0 %
PDW BLD-RTO: 13.4 % (ref 11.7–14.9)
PLATELET # BLD: 321 K/CU MM (ref 140–440)
PMV BLD AUTO: 10.2 FL (ref 7.5–11.1)
POTASSIUM SERPL-SCNC: 4.7 MMOL/L (ref 3.5–5.1)
RBC # BLD: 4.65 M/CU MM (ref 4.2–5.4)
SEGMENTED NEUTROPHILS ABSOLUTE COUNT: 6 K/CU MM
SEGMENTED NEUTROPHILS RELATIVE PERCENT: 64.3 % (ref 36–66)
SODIUM BLD-SCNC: 134 MMOL/L (ref 135–145)
TOTAL IMMATURE NEUTOROPHIL: 0.04 K/CU MM
TOTAL NUCLEATED RBC: 0 K/CU MM
TOTAL PROTEIN: 7.3 GM/DL (ref 6.4–8.2)
TOTAL PROTEIN: 7.3 GM/DL (ref 6.4–8.2)
TRIGL SERPL-MCNC: 91 MG/DL
WBC # BLD: 9.4 K/CU MM (ref 4–10.5)

## 2019-03-18 PROCEDURE — 80053 COMPREHEN METABOLIC PANEL: CPT

## 2019-03-18 PROCEDURE — 83690 ASSAY OF LIPASE: CPT

## 2019-03-18 PROCEDURE — 36415 COLL VENOUS BLD VENIPUNCTURE: CPT

## 2019-03-18 PROCEDURE — 82043 UR ALBUMIN QUANTITATIVE: CPT

## 2019-03-18 PROCEDURE — 83036 HEMOGLOBIN GLYCOSYLATED A1C: CPT

## 2019-03-18 PROCEDURE — 85025 COMPLETE CBC W/AUTO DIFF WBC: CPT

## 2019-03-18 PROCEDURE — 82248 BILIRUBIN DIRECT: CPT

## 2019-03-18 PROCEDURE — 80061 LIPID PANEL: CPT

## 2019-03-18 PROCEDURE — 83721 ASSAY OF BLOOD LIPOPROTEIN: CPT

## 2019-03-18 PROCEDURE — 82570 ASSAY OF URINE CREATININE: CPT

## 2019-04-02 ENCOUNTER — OFFICE VISIT (OUTPATIENT)
Dept: FAMILY MEDICINE CLINIC | Age: 39
End: 2019-04-02
Payer: MEDICARE

## 2019-04-02 DIAGNOSIS — L73.9 FOLLICULITIS: ICD-10-CM

## 2019-04-02 DIAGNOSIS — E78.2 MIXED HYPERLIPIDEMIA: ICD-10-CM

## 2019-04-02 DIAGNOSIS — R10.12 LEFT UPPER QUADRANT PAIN: Primary | ICD-10-CM

## 2019-04-02 DIAGNOSIS — R74.8 ABNORMAL LIVER ENZYMES: ICD-10-CM

## 2019-04-02 PROCEDURE — 1036F TOBACCO NON-USER: CPT | Performed by: NURSE PRACTITIONER

## 2019-04-02 PROCEDURE — G8417 CALC BMI ABV UP PARAM F/U: HCPCS | Performed by: NURSE PRACTITIONER

## 2019-04-02 PROCEDURE — 99214 OFFICE O/P EST MOD 30 MIN: CPT | Performed by: NURSE PRACTITIONER

## 2019-04-02 PROCEDURE — G8427 DOCREV CUR MEDS BY ELIG CLIN: HCPCS | Performed by: NURSE PRACTITIONER

## 2019-04-02 RX ORDER — ATORVASTATIN CALCIUM 20 MG/1
20 TABLET, FILM COATED ORAL DAILY
Qty: 30 TABLET | Refills: 5 | Status: SHIPPED | OUTPATIENT
Start: 2019-04-02 | End: 2019-10-13 | Stop reason: SDUPTHER

## 2019-04-02 RX ORDER — SULFAMETHOXAZOLE AND TRIMETHOPRIM 800; 160 MG/1; MG/1
1 TABLET ORAL 2 TIMES DAILY
Qty: 14 TABLET | Refills: 0 | Status: SHIPPED | OUTPATIENT
Start: 2019-04-02 | End: 2019-04-09

## 2019-04-02 NOTE — PROGRESS NOTES
SUBJECTIVE:  Jaelyn Mood   1980   female   No Known Allergies    Chief Complaint   Patient presents with    Abscess     in the right side of the groin, hx of staph     Discuss Labs     results     Constipation    Abdominal Pain     LUQ        HPI   Was seen at [facility] for abdominal pain with no conclusion  Not taking her cholesterol medication, it was elevated in March, but she did not fill her prescription. She has been drinking alcohol most days of the week, and trying to cut back. She is concerned about her liver tests. Has a new sore area in her right groin x 2 weeks. It is tender to touch. No drainage, no fever.     Past Medical History:   Diagnosis Date    Diabetes mellitus ([facility] [address]     Headache     Hyperlipidemia      Social History     Socioeconomic History    Marital status: Single     Spouse name: Not on file    Number of children: Not on file    Years of education: Not on file    Highest education level: Not on file   Occupational History    Not on file   Social Needs    Financial resource strain: Not on file    Food insecurity:     Worry: Not on file     Inability: Not on file    Transportation needs:     Medical: Not on file     Non-medical: Not on file   Tobacco Use    Smoking status: Never Smoker    Smokeless tobacco: Never Used   Substance and Sexual Activity    Alcohol use: Yes     Comment: occasionally    Drug use: No     Comment: occasionally    Sexual activity: Yes     Partners: Male   Lifestyle    Physical activity:     Days per week: Not on file     Minutes per session: Not on file    Stress: Not on file   Relationships    Social connections:     Talks on phone: Not on file     Gets together: Not on file     Attends Orthodox service: Not on file     Active member of club or organization: Not on file     Attends meetings of clubs or organizations: Not on file     Relationship status: Not on file    Intimate partner violence:     Fear of current or ex partner: Not on for 7 days  Dispense: 14 tablet; Refill: 0  Avoid shaving in area  Return for new or worsening symptoms or any concerns as needed      Orders Placed This Encounter   Procedures   [address] [facility] Lashon Mojica CNP, Gastroenterology, [place]     Referral Priority:   Routine     Referral Type:   Eval and Treat     Referral Reason:   Specialty Services Required     Referred to Provider:   EMMA Nazario CNP     Requested Specialty:   Nurse Practitioner Family     Number of Visits Requested:   1     Current Outpatient Medications   Medication Sig Dispense Refill    atorvastatin (LIPITOR) 20 MG tablet Take 1 tablet by mouth daily 30 tablet 5    sulfamethoxazole-trimethoprim (BACTRIM DS;SEPTRA DS) 800-160 MG per tablet Take 1 tablet by mouth 2 times daily for 7 days 14 tablet 0    dicyclomine (BENTYL) 10 MG capsule Take 1 capsule by mouth 3 times daily as needed (stomach cramps) 20 capsule 0    HUMALOG KWIKPEN 100 UNIT/ML pen       JANUVIA 100 MG tablet       Blood Glucose Monitoring Suppl (TRUE METRIX METER) w/Device KIT 1 KIT BY DOES NOT APPLY ROUTE DAILY  0    blood glucose monitor strips Check blood glucose AC and  strip 3    Lancets MISC Check blood glucose AC and  each 3    metFORMIN (GLUCOPHAGE) 500 MG tablet Take 1 tablet by mouth 2 times daily (with meals) (Patient taking differently: Take 500 mg by mouth 4 times daily Indications: pt reports taking 2 tabs in a.m., 2 tabs in p.m. ) 60 tablet 0    insulin glargine (BASAGLAR KWIKPEN) 100 UNIT/ML injection pen Inject 10 Units into the skin nightly 5 pen 3    Insulin Pen Needle 31G X 5 MM MISC 1 each by Does not apply route daily 100 each 3    acetaminophen (TYLENOL) 500 MG tablet Take 1,000 mg by mouth every 6 hours as needed.  ibuprofen (ADVIL;MOTRIN) 400 MG tablet Take 400 mg by mouth every 6 hours as needed. No current facility-administered medications for this visit.         Return in about 3 months (around 7/2/2019). Christina Denny DNP, FNP-C    Return for new or worsening symptoms or any concerns as needed.

## 2019-04-03 VITALS
HEART RATE: 108 BPM | WEIGHT: 235 LBS | HEIGHT: 65 IN | RESPIRATION RATE: 16 BRPM | OXYGEN SATURATION: 97 % | DIASTOLIC BLOOD PRESSURE: 70 MMHG | SYSTOLIC BLOOD PRESSURE: 108 MMHG | BODY MASS INDEX: 39.15 KG/M2

## 2019-04-03 ASSESSMENT — ENCOUNTER SYMPTOMS
BACK PAIN: 0
ABDOMINAL DISTENTION: 0
NAUSEA: 0
ABDOMINAL PAIN: 1
VOMITING: 0
SHORTNESS OF BREATH: 0

## 2019-04-10 ASSESSMENT — ENCOUNTER SYMPTOMS: RESPIRATORY NEGATIVE: 1

## 2019-04-18 ENCOUNTER — TELEPHONE (OUTPATIENT)
Dept: GASTROENTEROLOGY | Age: 39
End: 2019-04-18

## 2019-05-01 ENCOUNTER — TELEPHONE (OUTPATIENT)
Dept: FAMILY MEDICINE CLINIC | Age: 39
End: 2019-05-01

## 2019-05-01 NOTE — TELEPHONE ENCOUNTER
Patient states she has a yeast infection and would like RX for Diflucan sent to [facility]. Please advise.

## 2019-05-02 DIAGNOSIS — B37.31 CANDIDIASIS OF VAGINA: Primary | ICD-10-CM

## 2019-05-02 RX ORDER — FLUCONAZOLE 150 MG/1
150 TABLET ORAL DAILY
Qty: 2 TABLET | Refills: 0 | Status: SHIPPED | OUTPATIENT
Start: 2019-05-02 | End: 2019-07-10 | Stop reason: ALTCHOICE

## 2019-07-10 ENCOUNTER — OFFICE VISIT (OUTPATIENT)
Dept: FAMILY MEDICINE CLINIC | Age: 39
End: 2019-07-10
Payer: MEDICARE

## 2019-07-10 VITALS
OXYGEN SATURATION: 97 % | BODY MASS INDEX: 37.62 KG/M2 | WEIGHT: 225.8 LBS | DIASTOLIC BLOOD PRESSURE: 72 MMHG | HEIGHT: 65 IN | HEART RATE: 119 BPM | SYSTOLIC BLOOD PRESSURE: 128 MMHG

## 2019-07-10 DIAGNOSIS — F41.9 ANXIETY AND DEPRESSION: ICD-10-CM

## 2019-07-10 DIAGNOSIS — R11.0 NAUSEA: ICD-10-CM

## 2019-07-10 DIAGNOSIS — E11.65 UNCONTROLLED TYPE 2 DIABETES MELLITUS WITH HYPERGLYCEMIA, WITHOUT LONG-TERM CURRENT USE OF INSULIN (HCC): Primary | ICD-10-CM

## 2019-07-10 DIAGNOSIS — R10.10 PAIN OF UPPER ABDOMEN: ICD-10-CM

## 2019-07-10 DIAGNOSIS — F32.A ANXIETY AND DEPRESSION: ICD-10-CM

## 2019-07-10 LAB — HBA1C MFR BLD: 8.4 %

## 2019-07-10 PROCEDURE — 99214 OFFICE O/P EST MOD 30 MIN: CPT | Performed by: NURSE PRACTITIONER

## 2019-07-10 PROCEDURE — G8417 CALC BMI ABV UP PARAM F/U: HCPCS | Performed by: NURSE PRACTITIONER

## 2019-07-10 PROCEDURE — 2022F DILAT RTA XM EVC RTNOPTHY: CPT | Performed by: NURSE PRACTITIONER

## 2019-07-10 PROCEDURE — 83036 HEMOGLOBIN GLYCOSYLATED A1C: CPT | Performed by: NURSE PRACTITIONER

## 2019-07-10 PROCEDURE — 1036F TOBACCO NON-USER: CPT | Performed by: NURSE PRACTITIONER

## 2019-07-10 PROCEDURE — G8427 DOCREV CUR MEDS BY ELIG CLIN: HCPCS | Performed by: NURSE PRACTITIONER

## 2019-07-10 PROCEDURE — 3045F PR MOST RECENT HEMOGLOBIN A1C LEVEL 7.0-9.0%: CPT | Performed by: NURSE PRACTITIONER

## 2019-07-10 RX ORDER — ESCITALOPRAM OXALATE 10 MG/1
10 TABLET ORAL DAILY
Qty: 30 TABLET | Refills: 1 | Status: SHIPPED | OUTPATIENT
Start: 2019-07-10 | End: 2019-08-22 | Stop reason: SDUPTHER

## 2019-07-10 RX ORDER — ONDANSETRON 4 MG/1
4 TABLET, FILM COATED ORAL DAILY PRN
Qty: 30 TABLET | Refills: 0 | Status: SHIPPED | OUTPATIENT
Start: 2019-07-10 | End: 2020-02-12 | Stop reason: ALTCHOICE

## 2019-07-10 ASSESSMENT — ENCOUNTER SYMPTOMS
BACK PAIN: 0
ABDOMINAL DISTENTION: 0
SHORTNESS OF BREATH: 0
VOMITING: 0
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
## 2019-07-10 NOTE — PATIENT INSTRUCTIONS
condition or this instruction, always ask your healthcare professional. [address] any warranty or liability for your use of this information. Patient Education        Learning About Anxiety Disorders  What are anxiety disorders? Anxiety disorders are a type of medical problem. They cause severe anxiety. When you feel anxious, you feel that something bad is about to happen. This feeling interferes with your life. These disorders include:  · Generalized anxiety disorder. You feel worried and stressed about many everyday events and activities. This goes on for several months and disrupts your life on most days. · Panic disorder. You have repeated panic attacks. A panic attack is a sudden, intense fear or anxiety. It may make you feel short of breath. Your heart may pound. · Social anxiety disorder. You feel very anxious about what you will say or do in front of people. For example, you may be scared to talk or eat in public. This problem affects your daily life. · Phobias. You are very scared of a specific object, situation, or activity. For example, you may fear spiders, high places, or small spaces. What are the symptoms? Generalized anxiety disorder  Symptoms may include:  · Feeling worried and stressed about many things almost every day. · Feeling tired or irritable. You may have a hard time concentrating. · Having headaches or muscle aches. · Having a hard time getting to sleep or staying asleep. Panic disorder  You may have repeated panic attacks when there is no reason for feeling afraid. You may change your daily activities because you worry that you will have another attack. Symptoms may include:  · Intense fear, terror, or anxiety. · Trouble breathing or very fast breathing. · Chest pain or tightness. · A heartbeat that races or is not regular.   Social anxiety disorder  Symptoms may include:  · Fear about a social situation, such as eating in front of others or speaking in public. You may worry a lot. Or you may be afraid that something bad will happen. · Anxiety that can cause you to blush, sweat, and feel shaky. · A heartbeat that is faster than normal.  · A hard time focusing. Phobias  Symptoms may include:  · More fear than most people of being around an object, being in a situation, or doing an activity. You might also be stressed about the chance of being around the thing you fear. · Worry about losing control, panicking, fainting, or having physical symptoms like a faster heartbeat when you are around the situation or object. How are these disorders treated? Anxiety disorders can be treated with medicines or counseling. A combination of both may be used. Medicines may include:  · Antidepressants. These may help your symptoms by keeping chemicals in your brain in balance. · Benzodiazepines. These may give you short-term relief of your symptoms. Some people use cognitive-behavioral therapy. A therapist helps you learn to change stressful or bad thoughts into helpful thoughts. Lead a healthy lifestyle  A healthy lifestyle may help you feel better. · Get at least 30 minutes of exercise on most days of the week. Walking is a good choice. · Eat a healthy diet. Include fruits, vegetables, lean proteins, and whole grains in your diet each day. · Try to go to bed at the same time every night. Try for 8 hours of sleep a night. · Find ways to manage stress. Try relaxation exercises. · Avoid alcohol and illegal drugs. Follow-up care is a key part of your treatment and safety. Be sure to make and go to all appointments, and call your doctor if you are having problems. It's also a good idea to know your test results and keep a list of the medicines you take. Where can you learn more? Go to [email or web address].[employer]. org and sign in to your [employer] account.  Enter U534 in the [employer] box to learn more about \"Learning About Anxiety Disorders. \"     If you do not have an account, please click on the \"Sign Up Now\" link. Current as of: September 11, 2018  Content Version: 12.0  © 2553-7488 Healthwise, Incorporated. Care instructions adapted under license by [facility] ([facility]). If you have questions about a medical condition or this instruction, always ask your healthcare professional. [address] any warranty or liability for your use of this information.

## 2019-07-11 ASSESSMENT — ENCOUNTER SYMPTOMS
ABDOMINAL PAIN: 1
NAUSEA: 1

## 2019-07-24 ENCOUNTER — TELEPHONE (OUTPATIENT)
Dept: GASTROENTEROLOGY | Age: 39
End: 2019-07-24

## 2019-07-26 ENCOUNTER — TELEPHONE (OUTPATIENT)
Dept: FAMILY MEDICINE CLINIC | Age: 39
End: 2019-07-26

## 2019-07-26 DIAGNOSIS — R10.12 LUQ ABDOMINAL PAIN: Primary | ICD-10-CM

## 2019-07-26 DIAGNOSIS — R79.89 ABNORMAL LIVER FUNCTION TEST: ICD-10-CM

## 2019-08-02 ENCOUNTER — OFFICE VISIT (OUTPATIENT)
Dept: GASTROENTEROLOGY | Age: 39
End: 2019-08-02
Payer: MEDICARE

## 2019-08-02 VITALS
HEART RATE: 114 BPM | OXYGEN SATURATION: 98 % | HEIGHT: 65 IN | WEIGHT: 225 LBS | SYSTOLIC BLOOD PRESSURE: 116 MMHG | BODY MASS INDEX: 37.49 KG/M2 | DIASTOLIC BLOOD PRESSURE: 80 MMHG

## 2019-08-02 DIAGNOSIS — R11.0 NAUSEA: ICD-10-CM

## 2019-08-02 DIAGNOSIS — R74.8 ELEVATED LIVER ENZYMES: ICD-10-CM

## 2019-08-02 DIAGNOSIS — R14.0 ABDOMINAL BLOATING: ICD-10-CM

## 2019-08-02 DIAGNOSIS — R10.84 GENERALIZED ABDOMINAL PAIN: Primary | ICD-10-CM

## 2019-08-02 DIAGNOSIS — K21.9 GASTROESOPHAGEAL REFLUX DISEASE, ESOPHAGITIS PRESENCE NOT SPECIFIED: ICD-10-CM

## 2019-08-02 DIAGNOSIS — E66.01 CLASS 2 SEVERE OBESITY DUE TO EXCESS CALORIES WITH SERIOUS COMORBIDITY AND BODY MASS INDEX (BMI) OF 37.0 TO 37.9 IN ADULT (HCC): ICD-10-CM

## 2019-08-02 DIAGNOSIS — R19.7 DIARRHEA, UNSPECIFIED TYPE: ICD-10-CM

## 2019-08-02 PROCEDURE — G8427 DOCREV CUR MEDS BY ELIG CLIN: HCPCS | Performed by: NURSE PRACTITIONER

## 2019-08-02 PROCEDURE — 99205 OFFICE O/P NEW HI 60 MIN: CPT | Performed by: NURSE PRACTITIONER

## 2019-08-02 PROCEDURE — G8417 CALC BMI ABV UP PARAM F/U: HCPCS | Performed by: NURSE PRACTITIONER

## 2019-08-02 PROCEDURE — 1036F TOBACCO NON-USER: CPT | Performed by: NURSE PRACTITIONER

## 2019-08-02 RX ORDER — DICYCLOMINE HYDROCHLORIDE 10 MG/1
10 CAPSULE ORAL
COMMUNITY
End: 2019-11-05

## 2019-08-02 RX ORDER — OMEPRAZOLE 20 MG/1
20 CAPSULE, DELAYED RELEASE ORAL DAILY
Qty: 30 CAPSULE | Refills: 3 | Status: SHIPPED | OUTPATIENT
Start: 2019-08-02 | End: 2020-04-06 | Stop reason: SDUPTHER

## 2019-08-02 RX ORDER — POLYETHYLENE GLYCOL 3350 17 G/17G
238 POWDER, FOR SOLUTION ORAL ONCE
Qty: 1 BOTTLE | Refills: 0 | Status: SHIPPED | OUTPATIENT
Start: 2019-08-02 | End: 2019-08-02

## 2019-08-02 ASSESSMENT — ENCOUNTER SYMPTOMS
COLOR CHANGE: 0
ABDOMINAL PAIN: 1
VOMITING: 0
COUGH: 0
WHEEZING: 0
CONSTIPATION: 0
PHOTOPHOBIA: 0
BLOOD IN STOOL: 0
DIARRHEA: 1
SHORTNESS OF BREATH: 0
NAUSEA: 1
EYE PAIN: 0
BACK PAIN: 1

## 2019-08-02 NOTE — PROGRESS NOTES
Constitutional: Negative for appetite change, chills, diaphoresis, fatigue and fever. HENT: Negative for ear pain, hearing loss and tinnitus. Eyes: Negative for photophobia and pain. Respiratory: Negative for cough, shortness of breath and wheezing. Cardiovascular: Negative for chest pain, palpitations and leg swelling. Gastrointestinal: Positive for abdominal pain, diarrhea and nausea. Negative for blood in stool, constipation and vomiting. Endocrine: Negative for cold intolerance, heat intolerance and polydipsia. Genitourinary: Negative for dysuria, frequency and urgency. Musculoskeletal: Positive for back pain. Negative for myalgias and neck pain. Skin: Negative for color change, pallor and rash. Allergic/Immunologic: Negative for environmental allergies and food allergies. Neurological: Positive for dizziness and headaches. Negative for seizures and weakness. Hematological: Bruises/bleeds easily. Psychiatric/Behavioral: Positive for dysphoric mood and sleep disturbance. Negative for suicidal ideas. The patient is nervous/anxious.         Allergies  No Known Allergies    Medications  Current Outpatient Medications   Medication Sig Dispense Refill    dicyclomine (BENTYL) 10 MG capsule Take 10 mg by mouth 4 times daily (before meals and nightly)      omeprazole (PRILOSEC) 20 MG delayed release capsule Take 1 capsule by mouth Daily 30 capsule 3    bisacodyl (BISACODYL) 5 MG EC tablet Take 4 tablets once for colonoscopy prep 4 tablet 0    HUMALOG KWIKPEN 100 UNIT/ML pen       JANUVIA 100 MG tablet       metFORMIN (GLUCOPHAGE) 500 MG tablet Take 1 tablet by mouth 2 times daily (with meals) (Patient taking differently: Take 500 mg by mouth 4 times daily Indications: pt reports taking 2 tabs in a.m., 2 tabs in p.m. ) 60 tablet 0    insulin glargine (BASAGLAR KWIKPEN) 100 UNIT/ML injection pen Inject 10 Units into the skin nightly 5 pen 3    Insulin Pen Needle 31G X 5 MM MISC 1 each

## 2019-08-02 NOTE — LETTER
[address]    Your procedure with Dr. Jose Miranda has been scheduled at the     [facility] located at     [address] [place], [place] [address]                        11/6/19 /      9:30_______________            Procedure Date                Arrival Time (Arrive 1 hour early)       10:30_____________  Procedure Time                               If an emergency arises and you are unable to keep your procedure appointment, you must call [facility] at [phone] and our office at [phone] within 24 hours to let us know. COLONOSCOPY  MIRALAX AND DULCOLAX SPLIT BOWEL PREP    To prepare for this procedure, you will need to clean out your bowels or large intestines. Review all the information you are given as soon as you can so that you are prepared and know what to expect during and after your procedure. You may need to make changes to your diet or medications. Begin this bowel prep 1 day prior to your scheduled procedure. You will need an adult to drive you home after the procedure. Your  will need to check in with you at the facility so the staff are aware of who they are and needs to stay at the facility during the entire procedure. If you  does leave during the procedure, he or she needs to give the staff a phone number where they can be reached and stay within 30 minutes of the facility. If you are taking a cab, bus, or medical transportation service home after the procedure, an adult, other than the , needs to ride with you for your safety. You should have an adult with you to help you and check on you after the procedure at home for at least 6 hours after the procedure. If you do not have a  with you, your procedure will be rescheduled to another date. You may need to make changes to your medicines. If you take aspirin or NSAIDS, such as ibuprofen, naproxen, or Celebrex for pain, you do NOT need to stop taking these medicines before the procedure. If you take medicines for diabetes, ask the doctor who ordered your diabetes medication how to adjust these medicines for this procedure. If you take any of the blood thinner medications listed below:  ? Ask the doctor who ordered this medication if it is safe for you to stop taking this medicine before the procedure. If you have a stent or certain other health problems, do NOT stop taking these medicines unless otherwise directed by the doctor. ? If your doctor agrees you should stop taking any of the medications listed below, stop for the listed number of days or as your doctor recommends:    o Thi Clubs (Ticagretor)  5 days     o Coumadin (Warfarin)  5 days  o Effient (Prasugrel)  7 days  o Eliquis (Apixaban)  2 days  o Lovenox (Enoxaparin)  1 day  o Plavix (Clopidogrel)  5 days  o Pletal (Cilostazol)  5 days  o Pradaxa (Dabigatran)  2 days  o Savaysa (Edoxaban)  2 days  o Xarelto (Rivaroxaban)  1 day  o Aggrenox -  5 days                You will need to purchase 2 medicines over the counter from a pharmacy. They can be found in the laxative section. Store brands often cost less. You do not need a prescription. Ask the pharmacist to help you find what you need if you are having trouble finding it.  o A large bottle (8.3 ounces or 238 grams) of Miralax (Polyethelene Glycol 3350)  o 4 Dulcolax Laxative Tablets (Bisacodyl USP 5 mg)  o Also buy one large 64-ounce bottle of Gatorade, Powerade, or other sports drink that is not red or purple in color. Use Crystal Light or sugar-free sports drinks if you have diabetes.  The liquid will be used to mix your Miralax the day before the procedure.  o You will also need plenty of clear liquids from the list to drink during the day before your procedure    Acceptable clear liquids for your prep better to drink each cup quickly rather than taking small sips  ? Drink half the mixture this evening. Place the other half of the mixture in the refrigerator. You will need to drink the rest of the mixture in the morning. ? Continue to drink other clear liquids through the evening  Morning of the procedure    ? Six hours before your procedure, drink the rest of the Miralax mixture as before. You may need to set your alarm to get up to finish the prep medication if you have an early appointment time  ? Drink two 8-ounce cups of clear liquids after you finish the prep medication  ? You can drink clear liquids up to 4 hours prior to the procedure  ? Take your medicines for blood pressure, heart issues, seizures or pain with a sip of water up to 2 hours before the procedure      If you are vomiting up your prep medicine, have not had a bowel movement, or your bowels are not clear, please call our office at [phone]. If you call after normal business hours, please leave us a detailed voicemail so we can call you as soon as we get back into the office.

## 2019-08-05 ENCOUNTER — PREP FOR PROCEDURE (OUTPATIENT)
Dept: GASTROENTEROLOGY | Age: 39
End: 2019-08-05

## 2019-08-05 RX ORDER — SODIUM CHLORIDE, SODIUM LACTATE, POTASSIUM CHLORIDE, CALCIUM CHLORIDE 600; 310; 30; 20 MG/100ML; MG/100ML; MG/100ML; MG/100ML
INJECTION, SOLUTION INTRAVENOUS CONTINUOUS
Status: CANCELLED | OUTPATIENT
Start: 2019-11-06

## 2019-08-07 ENCOUNTER — HOSPITAL ENCOUNTER (OUTPATIENT)
Age: 39
Discharge: HOME | End: 2019-08-07
Payer: MEDICARE

## 2019-08-07 ENCOUNTER — HOSPITAL ENCOUNTER (OUTPATIENT)
Age: 39
Setting detail: SPECIMEN
Discharge: HOME | End: 2019-08-07
Payer: MEDICARE

## 2019-08-07 LAB
ALBUMIN SERPL-MCNC: 4.4 GM/DL (ref 3.4–5)
ALP BLD-CCNC: 92 IU/L (ref 40–129)
ALT SERPL-CCNC: 126 U/L (ref 10–40)
AST SERPL-CCNC: 79 IU/L (ref 15–37)
BASOPHILS ABSOLUTE: 0 K/CU MM
BASOPHILS RELATIVE PERCENT: 0.4 % (ref 0–1)
BILIRUB SERPL-MCNC: 0.2 MG/DL (ref 0–1)
BILIRUBIN DIRECT: 0.2 MG/DL (ref 0–0.3)
BILIRUBIN, INDIRECT: 0 MG/DL (ref 0–0.7)
DIFFERENTIAL TYPE: ABNORMAL
EOSINOPHILS ABSOLUTE: 0.1 K/CU MM
EOSINOPHILS RELATIVE PERCENT: 1.1 % (ref 0–3)
FERRITIN: 533 NG/ML (ref 15–150)
GAMMA GLUTAMYL TRANSFERASE: 175 IU/L (ref 5–36)
HAV IGM SER IA-ACNC: NON REACTIVE
HBV SURFACE AB TITR SER: <3.5 {TITER}
HCT VFR BLD CALC: 39.9 % (ref 37–47)
HEMOGLOBIN: 12.8 GM/DL (ref 12.5–16)
HEPATITIS B SURFACE ANTIGEN: NON REACTIVE
HEPATITIS C ANTIBODY: NON REACTIVE
IGA: 432 MG/DL (ref 69–382)
IGG,SERUM: 1114 MG/DL (ref 723–1685)
IMMATURE NEUTROPHIL %: 0.2 % (ref 0–0.43)
INR BLD: 0.98 INDEX
IRON: 38 UG/DL (ref 37–145)
LYMPHOCYTES ABSOLUTE: 2.3 K/CU MM
LYMPHOCYTES RELATIVE PERCENT: 23.3 % (ref 24–44)
MCH RBC QN AUTO: 29.8 PG (ref 27–31)
MCHC RBC AUTO-ENTMCNC: 32.1 % (ref 32–36)
MCV RBC AUTO: 92.8 FL (ref 78–100)
MONOCYTES ABSOLUTE: 0.6 K/CU MM
MONOCYTES RELATIVE PERCENT: 6.1 % (ref 0–4)
NUCLEATED RBC %: 0 %
PCT TRANSFERRIN: 12 % (ref 10–44)
PDW BLD-RTO: 13.1 % (ref 11.7–14.9)
PLATELET # BLD: 337 K/CU MM (ref 140–440)
PMV BLD AUTO: 11.3 FL (ref 7.5–11.1)
PROTHROMBIN TIME: 11.2 SECONDS (ref 9.12–12.5)
RBC # BLD: 4.3 M/CU MM (ref 4.2–5.4)
SEGMENTED NEUTROPHILS ABSOLUTE COUNT: 6.8 K/CU MM
SEGMENTED NEUTROPHILS RELATIVE PERCENT: 68.9 % (ref 36–66)
TOTAL IMMATURE NEUTOROPHIL: 0.02 K/CU MM
TOTAL IRON BINDING CAPACITY: 316 UG/DL (ref 250–450)
TOTAL NUCLEATED RBC: 0 K/CU MM
TOTAL PROTEIN: 7.4 GM/DL (ref 6.4–8.2)
UNSATURATED IRON BINDING CAPACITY: 278 UG/DL (ref 110–370)
WBC # BLD: 9.8 K/CU MM (ref 4–10.5)

## 2019-08-07 PROCEDURE — 86038 ANTINUCLEAR ANTIBODIES: CPT

## 2019-08-07 PROCEDURE — 82390 ASSAY OF CERULOPLASMIN: CPT

## 2019-08-07 PROCEDURE — 85025 COMPLETE CBC W/AUTO DIFF WBC: CPT

## 2019-08-07 PROCEDURE — 86704 HEP B CORE ANTIBODY TOTAL: CPT

## 2019-08-07 PROCEDURE — 86694 HERPES SIMPLEX NES ANTBDY: CPT

## 2019-08-07 PROCEDURE — 82705 FATS/LIPIDS FECES QUAL: CPT

## 2019-08-07 PROCEDURE — 87329 GIARDIA AG IA: CPT

## 2019-08-07 PROCEDURE — 83630 LACTOFERRIN FECAL (QUAL): CPT

## 2019-08-07 PROCEDURE — 83516 IMMUNOASSAY NONANTIBODY: CPT

## 2019-08-07 PROCEDURE — 85610 PROTHROMBIN TIME: CPT

## 2019-08-07 PROCEDURE — 36415 COLL VENOUS BLD VENIPUNCTURE: CPT

## 2019-08-07 PROCEDURE — 82977 ASSAY OF GGT: CPT

## 2019-08-07 PROCEDURE — 86708 HEPATITIS A ANTIBODY: CPT

## 2019-08-07 PROCEDURE — 82784 ASSAY IGA/IGD/IGG/IGM EACH: CPT

## 2019-08-07 PROCEDURE — 87046 STOOL CULTR AEROBIC BACT EA: CPT

## 2019-08-07 PROCEDURE — 83993 ASSAY FOR CALPROTECTIN FECAL: CPT

## 2019-08-07 PROCEDURE — 86709 HEPATITIS A IGM ANTIBODY: CPT

## 2019-08-07 PROCEDURE — 87324 CLOSTRIDIUM AG IA: CPT

## 2019-08-07 PROCEDURE — 83550 IRON BINDING TEST: CPT

## 2019-08-07 PROCEDURE — 83540 ASSAY OF IRON: CPT

## 2019-08-07 PROCEDURE — 87045 FECES CULTURE AEROBIC BACT: CPT

## 2019-08-07 PROCEDURE — 87209 SMEAR COMPLEX STAIN: CPT

## 2019-08-07 PROCEDURE — 82728 ASSAY OF FERRITIN: CPT

## 2019-08-07 PROCEDURE — 80076 HEPATIC FUNCTION PANEL: CPT

## 2019-08-07 PROCEDURE — 87177 OVA AND PARASITES SMEARS: CPT

## 2019-08-07 PROCEDURE — 87338 HPYLORI STOOL AG IA: CPT

## 2019-08-07 PROCEDURE — 86803 HEPATITIS C AB TEST: CPT

## 2019-08-07 PROCEDURE — 82104 ALPHA-1-ANTITRYPSIN PHENO: CPT

## 2019-08-08 ENCOUNTER — OFFICE VISIT (OUTPATIENT)
Dept: PSYCHOLOGY | Age: 39
End: 2019-08-08
Payer: MEDICARE

## 2019-08-08 DIAGNOSIS — F32.A DEPRESSIVE DISORDER: Primary | ICD-10-CM

## 2019-08-08 DIAGNOSIS — F41.9 ANXIETY: ICD-10-CM

## 2019-08-08 LAB
ANTI-NUCLEAR ANTIBODY (ANA): NORMAL
ANTI-NUCLEAR ANTIBODY (ANA): NORMAL
CERULOPLASMIN: 25 MG/DL (ref 17–54)
CERULOPLASMIN: NORMAL MG/DL (ref 17–54)
F-ACTIN AB, IGG: 3 UNITS (ref 0–19)
F-ACTIN AB, IGG: NORMAL UNITS (ref 0–19)
GIARDIA ANTIGEN STOOL: NEGATIVE
LACTOFERRIN, QUAL: NEGATIVE
REASON FOR REJECTION: NORMAL
REJECTED TEST: NORMAL

## 2019-08-08 PROCEDURE — 1036F TOBACCO NON-USER: CPT | Performed by: PSYCHOLOGIST

## 2019-08-08 PROCEDURE — 90791 PSYCH DIAGNOSTIC EVALUATION: CPT | Performed by: PSYCHOLOGIST

## 2019-08-09 LAB
CULTURE: NORMAL
FAT QUALITATIVE NEUTRAL STOOL: NORMAL
FAT QUALITATIVE SPLIT STOOL: NORMAL
FAT QUALITATIVE SPLIT STOOL: NORMAL
HAV AB SERPL IA-ACNC: NEGATIVE
HAV AB SERPL IA-ACNC: NORMAL
HEPATITIS B CORE TOTAL ANTIBODY: NEGATIVE
HEPATITIS B CORE TOTAL ANTIBODY: NORMAL
HSV I/II IGG: 0.23 IV
HSV I/II IGG: NORMAL IV
Lab: NORMAL
SPECIMEN: NORMAL
TRANSGLUTAMINASE IGA: 1 U/ML (ref 0–3)

## 2019-08-10 LAB
ALPHA-1 ANTITRYPSIN PHENOTYPE: NORMAL
ALPHA-1 ANTITRYPSIN PHENOTYPE: NORMAL
ALPHA-1 ANTITRYPSIN: NORMAL MG/DL (ref 90–200)
CALPROTECTIN, FECAL: 24 UG/G
CALPROTECTIN, FECAL: NORMAL UG/G
H PYLORI ANTIGEN STOOL: NEGATIVE
H PYLORI ANTIGEN STOOL: NORMAL

## 2019-08-12 ENCOUNTER — HOSPITAL ENCOUNTER (OUTPATIENT)
Dept: CT IMAGING | Age: 39
Discharge: HOME | End: 2019-08-12
Payer: MEDICARE

## 2019-08-12 PROCEDURE — 74177 CT ABD & PELVIS W/CONTRAST: CPT

## 2019-08-12 PROCEDURE — 6360000004 HC RX CONTRAST MEDICATION: Performed by: NURSE PRACTITIONER

## 2019-08-12 RX ADMIN — IOHEXOL 50 ML: 240 INJECTION, SOLUTION INTRATHECAL; INTRAVASCULAR; INTRAVENOUS; ORAL at 13:42

## 2019-08-13 LAB
OVA AND PARASITE WET MOUNT: NEGATIVE
OVA AND PARASITE WET MOUNT: NORMAL
TRICHROME SMEAR, STOOL O&P: NEGATIVE

## 2019-08-16 ENCOUNTER — HOSPITAL ENCOUNTER (OUTPATIENT)
Dept: CT IMAGING | Age: 39
Discharge: HOME | End: 2019-08-16
Payer: MEDICARE

## 2019-08-16 DIAGNOSIS — R14.0 BLOATING: ICD-10-CM

## 2019-08-16 DIAGNOSIS — R10.84 GENERALIZED ABDOMINAL PAIN: ICD-10-CM

## 2019-08-16 PROCEDURE — 74177 CT ABD & PELVIS W/CONTRAST: CPT

## 2019-08-16 PROCEDURE — 6360000004 HC RX CONTRAST MEDICATION: Performed by: NURSE PRACTITIONER

## 2019-08-16 RX ORDER — SODIUM CHLORIDE 0.9 % (FLUSH) 0.9 %
10 SYRINGE (ML) INJECTION ONCE
Status: DISCONTINUED | OUTPATIENT
Start: 2019-08-16 | End: 2019-08-17 | Stop reason: HOSPADM

## 2019-08-16 RX ADMIN — IOHEXOL 50 ML: 240 INJECTION, SOLUTION INTRATHECAL; INTRAVASCULAR; INTRAVENOUS; ORAL at 12:40

## 2019-08-16 RX ADMIN — IOPAMIDOL 75 ML: 755 INJECTION, SOLUTION INTRAVENOUS at 12:40

## 2019-08-22 ENCOUNTER — OFFICE VISIT (OUTPATIENT)
Dept: FAMILY MEDICINE CLINIC | Age: 39
End: 2019-08-22
Payer: MEDICARE

## 2019-08-22 ENCOUNTER — HOSPITAL ENCOUNTER (OUTPATIENT)
Dept: DIABETES SERVICES | Age: 39
Setting detail: THERAPIES SERIES
Discharge: HOME | End: 2019-08-22
Payer: MEDICARE

## 2019-08-22 VITALS
BODY MASS INDEX: 36.49 KG/M2 | SYSTOLIC BLOOD PRESSURE: 112 MMHG | HEART RATE: 101 BPM | HEIGHT: 65 IN | WEIGHT: 219 LBS | DIASTOLIC BLOOD PRESSURE: 64 MMHG | OXYGEN SATURATION: 96 %

## 2019-08-22 DIAGNOSIS — R14.0 ABDOMINAL BLOATING: ICD-10-CM

## 2019-08-22 DIAGNOSIS — R10.84 GENERALIZED ABDOMINAL PAIN: ICD-10-CM

## 2019-08-22 DIAGNOSIS — R11.0 NAUSEA: Primary | ICD-10-CM

## 2019-08-22 DIAGNOSIS — F41.9 ANXIETY AND DEPRESSION: ICD-10-CM

## 2019-08-22 DIAGNOSIS — F32.A ANXIETY AND DEPRESSION: ICD-10-CM

## 2019-08-22 PROCEDURE — 1036F TOBACCO NON-USER: CPT | Performed by: NURSE PRACTITIONER

## 2019-08-22 PROCEDURE — G8417 CALC BMI ABV UP PARAM F/U: HCPCS | Performed by: NURSE PRACTITIONER

## 2019-08-22 PROCEDURE — 99214 OFFICE O/P EST MOD 30 MIN: CPT | Performed by: NURSE PRACTITIONER

## 2019-08-22 PROCEDURE — G8427 DOCREV CUR MEDS BY ELIG CLIN: HCPCS | Performed by: NURSE PRACTITIONER

## 2019-08-22 PROCEDURE — 97802 MEDICAL NUTRITION INDIV IN: CPT

## 2019-08-22 RX ORDER — ESCITALOPRAM OXALATE 10 MG/1
10 TABLET ORAL DAILY
Qty: 30 TABLET | Refills: 2 | Status: SHIPPED | OUTPATIENT
Start: 2019-08-22 | End: 2019-11-05 | Stop reason: DRUGHIGH

## 2019-08-22 RX ORDER — ONDANSETRON 4 MG/1
4 TABLET, FILM COATED ORAL EVERY 8 HOURS PRN
Qty: 30 TABLET | Refills: 0 | Status: SHIPPED | OUTPATIENT
Start: 2019-08-22 | End: 2019-09-30

## 2019-08-22 NOTE — PATIENT INSTRUCTIONS
doctor. You don't have to eat special foods. You can eat what your family eats, including sweets once in a while. But you do have to pay attention to how often you eat and how much you eat of certain foods. You may want to work with a dietitian or a certified diabetes educator. He or she can give you tips and meal ideas and can answer your questions about meal planning. This health professional can also help you reach a healthy weight if that is one of your goals. What plan is right for you? Your dietitian or diabetes educator may suggest that you start with the plate format or carbohydrate counting. The plate format  The plate format is a simple way to help you manage how you eat. You plan meals by learning how much space each food should take on a plate. Using the plate format helps you spread carbohydrate throughout the day. It can make it easier to keep your blood sugar level within your target range. It also helps you see if you're eating healthy portion sizes. To use the plate format, you put non-starchy vegetables on half your plate. Add meat or meat substitutes on one-quarter of the plate. Put a grain or starchy vegetable (such as brown rice or a potato) on the final quarter of the plate. You can add a small piece of fruit and some low-fat or fat-free milk or yogurt, depending on your carbohydrate goal for each meal.  Here are some tips for using the plate format:  · Make sure that you are not using an oversized plate. A 9-inch plate is best. Many restaurants use larger plates. · Get used to using the plate format at home. Then you can use it when you eat out. · Write down your questions about using the plate format. Talk to your doctor, a dietitian, or a diabetes educator about your concerns. Carbohydrate counting  With carbohydrate counting, you plan meals based on the amount of carbohydrate in each food. Carbohydrate raises blood sugar higher and more quickly than any other nutrient.  It is found information. Patient Education        Counting Carbohydrates: Care Instructions  Your Care Instructions    You don't have to eat special foods when you have diabetes. You just have to be careful to eat healthy foods. Carbohydrates (carbs) raise blood sugar higher and quicker than any other nutrient. Carbs are found in desserts, breads and cereals, and fruit. They're also in starchy vegetables. These include potatoes, corn, and grains such as rice and pasta. Carbs are also in milk and yogurt. The more carbs you eat at one time, the higher your blood sugar will rise. Spreading carbs all through the day helps keep your blood sugar levels within your target range. Counting carbs is one of the best ways to keep your blood sugar under control. If you use insulin, counting carbs helps you match the right amount of insulin to the number of grams of carbs in a meal. Then you can change your diet and insulin dose as needed. Testing your blood sugar several times a day can help you learn how carbs affect your blood sugar. A registered dietitian or certified diabetes educator can help you plan meals and snacks. Follow-up care is a key part of your treatment and safety. Be sure to make and go to all appointments, and call your doctor if you are having problems. It's also a good idea to know your test results and keep a list of the medicines you take. How can you care for yourself at home? Know your daily amount of carbohydrates  Your daily amount depends on several things, such as your weight, how active you are, which diabetes medicines you take, and what your goals are for your blood sugar levels. A registered dietitian or certified diabetes educator can help you plan how many carbs to include in each meal and snack. For most adults, a guideline for the daily amount of carbs is:  · 45 to 60 grams at each meal. That's about the same as 3 to 4 carbohydrate servings. · 15 to 20 grams at each snack.  That's about

## 2019-08-26 ASSESSMENT — ENCOUNTER SYMPTOMS
BACK PAIN: 0
SHORTNESS OF BREATH: 0
VOMITING: 0
ABDOMINAL DISTENTION: 0
NAUSEA: 0

## 2019-09-30 ENCOUNTER — OFFICE VISIT (OUTPATIENT)
Dept: GASTROENTEROLOGY | Age: 39
End: 2019-09-30
Payer: MEDICARE

## 2019-09-30 VITALS
OXYGEN SATURATION: 98 % | WEIGHT: 217 LBS | HEIGHT: 66 IN | BODY MASS INDEX: 34.87 KG/M2 | DIASTOLIC BLOOD PRESSURE: 80 MMHG | HEART RATE: 98 BPM | SYSTOLIC BLOOD PRESSURE: 118 MMHG

## 2019-09-30 DIAGNOSIS — R19.4 ALTERED BOWEL HABITS: ICD-10-CM

## 2019-09-30 DIAGNOSIS — R74.8 ELEVATED LIVER ENZYMES: ICD-10-CM

## 2019-09-30 DIAGNOSIS — K76.0 FATTY LIVER: Primary | ICD-10-CM

## 2019-09-30 DIAGNOSIS — R10.84 GENERALIZED ABDOMINAL PAIN: ICD-10-CM

## 2019-09-30 DIAGNOSIS — K21.9 GASTROESOPHAGEAL REFLUX DISEASE, ESOPHAGITIS PRESENCE NOT SPECIFIED: ICD-10-CM

## 2019-09-30 DIAGNOSIS — R79.89 ELEVATED FERRITIN: ICD-10-CM

## 2019-09-30 PROCEDURE — G8417 CALC BMI ABV UP PARAM F/U: HCPCS | Performed by: NURSE PRACTITIONER

## 2019-09-30 PROCEDURE — G8427 DOCREV CUR MEDS BY ELIG CLIN: HCPCS | Performed by: NURSE PRACTITIONER

## 2019-09-30 PROCEDURE — 1036F TOBACCO NON-USER: CPT | Performed by: NURSE PRACTITIONER

## 2019-09-30 PROCEDURE — 99215 OFFICE O/P EST HI 40 MIN: CPT | Performed by: NURSE PRACTITIONER

## 2019-09-30 ASSESSMENT — ENCOUNTER SYMPTOMS
CONSTIPATION: 0
EYE PAIN: 0
ABDOMINAL PAIN: 0
COUGH: 0
BLOOD IN STOOL: 0
VOMITING: 0
WHEEZING: 0
SHORTNESS OF BREATH: 0

## 2019-10-01 RX ORDER — SODIUM CHLORIDE, SODIUM LACTATE, POTASSIUM CHLORIDE, CALCIUM CHLORIDE 600; 310; 30; 20 MG/100ML; MG/100ML; MG/100ML; MG/100ML
INJECTION, SOLUTION INTRAVENOUS CONTINUOUS
Status: CANCELLED | OUTPATIENT
Start: 2019-11-06

## 2019-10-01 ASSESSMENT — ENCOUNTER SYMPTOMS
COLOR CHANGE: 0
BACK PAIN: 1
NAUSEA: 1
DIARRHEA: 1

## 2019-10-13 DIAGNOSIS — E78.2 MIXED HYPERLIPIDEMIA: ICD-10-CM

## 2019-10-14 RX ORDER — ATORVASTATIN CALCIUM 20 MG/1
TABLET, FILM COATED ORAL
Qty: 30 TABLET | Refills: 5 | Status: SHIPPED | OUTPATIENT
Start: 2019-10-14 | End: 2020-12-03 | Stop reason: SDUPTHER

## 2019-10-23 ENCOUNTER — PREP FOR PROCEDURE (OUTPATIENT)
Dept: GASTROENTEROLOGY | Age: 39
End: 2019-10-23

## 2019-10-23 DIAGNOSIS — Z01.818 PREOP TESTING: Primary | ICD-10-CM

## 2019-10-23 RX ORDER — SODIUM CHLORIDE 0.9 % (FLUSH) 0.9 %
10 SYRINGE (ML) INJECTION PRN
Status: CANCELLED | OUTPATIENT
Start: 2019-10-23

## 2019-10-23 RX ORDER — SODIUM CHLORIDE 0.9 % (FLUSH) 0.9 %
10 SYRINGE (ML) INJECTION EVERY 12 HOURS SCHEDULED
Status: CANCELLED | OUTPATIENT
Start: 2019-10-23

## 2019-10-23 RX ORDER — SODIUM CHLORIDE, SODIUM LACTATE, POTASSIUM CHLORIDE, CALCIUM CHLORIDE 600; 310; 30; 20 MG/100ML; MG/100ML; MG/100ML; MG/100ML
INJECTION, SOLUTION INTRAVENOUS CONTINUOUS
Status: CANCELLED | OUTPATIENT
Start: 2019-10-23

## 2019-10-24 ENCOUNTER — TELEPHONE (OUTPATIENT)
Dept: GASTROENTEROLOGY | Age: 39
End: 2019-10-24

## 2019-11-05 ENCOUNTER — OFFICE VISIT (OUTPATIENT)
Dept: FAMILY MEDICINE CLINIC | Age: 39
End: 2019-11-05
Payer: MEDICARE

## 2019-11-05 ENCOUNTER — TELEPHONE (OUTPATIENT)
Dept: GASTROENTEROLOGY | Age: 39
End: 2019-11-05

## 2019-11-05 VITALS
RESPIRATION RATE: 14 BRPM | HEART RATE: 80 BPM | SYSTOLIC BLOOD PRESSURE: 124 MMHG | OXYGEN SATURATION: 97 % | HEIGHT: 66 IN | DIASTOLIC BLOOD PRESSURE: 72 MMHG | WEIGHT: 217.4 LBS | BODY MASS INDEX: 34.94 KG/M2

## 2019-11-05 DIAGNOSIS — L65.9 HAIR LOSS: ICD-10-CM

## 2019-11-05 DIAGNOSIS — F32.A ANXIETY AND DEPRESSION: ICD-10-CM

## 2019-11-05 DIAGNOSIS — Z79.4 TYPE 2 DIABETES MELLITUS WITHOUT COMPLICATION, WITH LONG-TERM CURRENT USE OF INSULIN (HCC): Primary | ICD-10-CM

## 2019-11-05 DIAGNOSIS — E11.9 TYPE 2 DIABETES MELLITUS WITHOUT COMPLICATION, WITH LONG-TERM CURRENT USE OF INSULIN (HCC): Primary | ICD-10-CM

## 2019-11-05 DIAGNOSIS — F41.9 ANXIETY AND DEPRESSION: ICD-10-CM

## 2019-11-05 LAB — HBA1C MFR BLD: 9.1 %

## 2019-11-05 PROCEDURE — G8484 FLU IMMUNIZE NO ADMIN: HCPCS | Performed by: NURSE PRACTITIONER

## 2019-11-05 PROCEDURE — 3046F HEMOGLOBIN A1C LEVEL >9.0%: CPT | Performed by: NURSE PRACTITIONER

## 2019-11-05 PROCEDURE — 1036F TOBACCO NON-USER: CPT | Performed by: NURSE PRACTITIONER

## 2019-11-05 PROCEDURE — 2022F DILAT RTA XM EVC RTNOPTHY: CPT | Performed by: NURSE PRACTITIONER

## 2019-11-05 PROCEDURE — 99214 OFFICE O/P EST MOD 30 MIN: CPT | Performed by: NURSE PRACTITIONER

## 2019-11-05 PROCEDURE — 83036 HEMOGLOBIN GLYCOSYLATED A1C: CPT | Performed by: NURSE PRACTITIONER

## 2019-11-05 PROCEDURE — G8427 DOCREV CUR MEDS BY ELIG CLIN: HCPCS | Performed by: NURSE PRACTITIONER

## 2019-11-05 PROCEDURE — G8417 CALC BMI ABV UP PARAM F/U: HCPCS | Performed by: NURSE PRACTITIONER

## 2019-11-05 RX ORDER — ESCITALOPRAM OXALATE 20 MG/1
20 TABLET ORAL DAILY
Qty: 30 TABLET | Refills: 2 | Status: SHIPPED | OUTPATIENT
Start: 2019-11-05 | End: 2019-12-12 | Stop reason: SDUPTHER

## 2019-11-05 SDOH — HEALTH STABILITY: MENTAL HEALTH: HOW OFTEN DO YOU HAVE A DRINK CONTAINING ALCOHOL?: 4 OR MORE TIMES A WEEK

## 2019-11-05 SDOH — HEALTH STABILITY: MENTAL HEALTH: HOW MANY STANDARD DRINKS CONTAINING ALCOHOL DO YOU HAVE ON A TYPICAL DAY?: PATIENT DECLINED

## 2019-11-05 ASSESSMENT — ENCOUNTER SYMPTOMS
SHORTNESS OF BREATH: 0
ABDOMINAL DISTENTION: 0
BACK PAIN: 0
VOMITING: 0
NAUSEA: 0

## 2019-11-06 ENCOUNTER — HOSPITAL ENCOUNTER (OUTPATIENT)
Age: 39
Setting detail: OUTPATIENT SURGERY
Discharge: HOME | End: 2019-11-06
Attending: SURGERY | Admitting: SURGERY
Payer: MEDICARE

## 2019-11-06 ENCOUNTER — ANESTHESIA (OUTPATIENT)
Dept: OPERATING ROOM | Age: 39
End: 2019-11-06
Payer: MEDICARE

## 2019-11-06 ENCOUNTER — ANESTHESIA EVENT (OUTPATIENT)
Dept: OPERATING ROOM | Age: 39
End: 2019-11-06
Payer: MEDICARE

## 2019-11-06 VITALS
HEIGHT: 66 IN | TEMPERATURE: 97 F | OXYGEN SATURATION: 98 % | DIASTOLIC BLOOD PRESSURE: 77 MMHG | RESPIRATION RATE: 16 BRPM | HEART RATE: 72 BPM | WEIGHT: 213 LBS | BODY MASS INDEX: 34.23 KG/M2 | SYSTOLIC BLOOD PRESSURE: 114 MMHG

## 2019-11-06 VITALS
RESPIRATION RATE: 18 BRPM | OXYGEN SATURATION: 96 % | SYSTOLIC BLOOD PRESSURE: 95 MMHG | DIASTOLIC BLOOD PRESSURE: 58 MMHG

## 2019-11-06 LAB
GLUCOSE BLD-MCNC: 197 MG/DL (ref 70–99)
PREGNANCY TEST URINE, POC: NEGATIVE
PREGNANCY TEST URINE, POC: NORMAL

## 2019-11-06 PROCEDURE — 7100000010 HC PHASE II RECOVERY - FIRST 15 MIN: Performed by: SURGERY

## 2019-11-06 PROCEDURE — 2709999900 HC NON-CHARGEABLE SUPPLY: Performed by: SURGERY

## 2019-11-06 PROCEDURE — 43239 EGD BIOPSY SINGLE/MULTIPLE: CPT | Performed by: SURGERY

## 2019-11-06 PROCEDURE — 3700000000 HC ANESTHESIA ATTENDED CARE: Performed by: SURGERY

## 2019-11-06 PROCEDURE — 3700000001 HC ADD 15 MINUTES (ANESTHESIA): Performed by: SURGERY

## 2019-11-06 PROCEDURE — 82962 GLUCOSE BLOOD TEST: CPT

## 2019-11-06 PROCEDURE — 3609012400 HC EGD TRANSORAL BIOPSY SINGLE/MULTIPLE: Performed by: SURGERY

## 2019-11-06 PROCEDURE — 2500000003 HC RX 250 WO HCPCS: Performed by: NURSE ANESTHETIST, CERTIFIED REGISTERED

## 2019-11-06 PROCEDURE — 3609010600 HC COLONOSCOPY POLYPECTOMY SNARE/COLD BIOPSY: Performed by: SURGERY

## 2019-11-06 PROCEDURE — 6360000002 HC RX W HCPCS: Performed by: NURSE ANESTHETIST, CERTIFIED REGISTERED

## 2019-11-06 PROCEDURE — 2580000003 HC RX 258: Performed by: NURSE PRACTITIONER

## 2019-11-06 PROCEDURE — 88342 IMHCHEM/IMCYTCHM 1ST ANTB: CPT

## 2019-11-06 PROCEDURE — 88305 TISSUE EXAM BY PATHOLOGIST: CPT

## 2019-11-06 PROCEDURE — 81025 URINE PREGNANCY TEST: CPT

## 2019-11-06 PROCEDURE — 45380 COLONOSCOPY AND BIOPSY: CPT | Performed by: SURGERY

## 2019-11-06 PROCEDURE — 7100000011 HC PHASE II RECOVERY - ADDTL 15 MIN: Performed by: SURGERY

## 2019-11-06 RX ORDER — LIDOCAINE HYDROCHLORIDE 20 MG/ML
INJECTION, SOLUTION EPIDURAL; INFILTRATION; INTRACAUDAL; PERINEURAL PRN
Status: DISCONTINUED | OUTPATIENT
Start: 2019-11-06 | End: 2019-11-06 | Stop reason: SDUPTHER

## 2019-11-06 RX ORDER — SODIUM CHLORIDE 0.9 % (FLUSH) 0.9 %
10 SYRINGE (ML) INJECTION PRN
Status: DISCONTINUED | OUTPATIENT
Start: 2019-11-06 | End: 2019-11-06 | Stop reason: HOSPADM

## 2019-11-06 RX ORDER — FENTANYL CITRATE 50 UG/ML
INJECTION, SOLUTION INTRAMUSCULAR; INTRAVENOUS PRN
Status: DISCONTINUED | OUTPATIENT
Start: 2019-11-06 | End: 2019-11-06 | Stop reason: SDUPTHER

## 2019-11-06 RX ORDER — SODIUM CHLORIDE, SODIUM LACTATE, POTASSIUM CHLORIDE, CALCIUM CHLORIDE 600; 310; 30; 20 MG/100ML; MG/100ML; MG/100ML; MG/100ML
INJECTION, SOLUTION INTRAVENOUS CONTINUOUS
Status: DISCONTINUED | OUTPATIENT
Start: 2019-11-06 | End: 2019-11-06 | Stop reason: HOSPADM

## 2019-11-06 RX ORDER — MIDAZOLAM HYDROCHLORIDE 1 MG/ML
INJECTION INTRAMUSCULAR; INTRAVENOUS PRN
Status: DISCONTINUED | OUTPATIENT
Start: 2019-11-06 | End: 2019-11-06 | Stop reason: SDUPTHER

## 2019-11-06 RX ORDER — SODIUM CHLORIDE 0.9 % (FLUSH) 0.9 %
10 SYRINGE (ML) INJECTION EVERY 12 HOURS SCHEDULED
Status: DISCONTINUED | OUTPATIENT
Start: 2019-11-06 | End: 2019-11-06 | Stop reason: HOSPADM

## 2019-11-06 RX ORDER — PROPOFOL 10 MG/ML
INJECTION, EMULSION INTRAVENOUS PRN
Status: DISCONTINUED | OUTPATIENT
Start: 2019-11-06 | End: 2019-11-06 | Stop reason: SDUPTHER

## 2019-11-06 RX ADMIN — FENTANYL CITRATE 25 MCG: 50 INJECTION INTRAMUSCULAR; INTRAVENOUS at 10:23

## 2019-11-06 RX ADMIN — FENTANYL CITRATE 50 MCG: 50 INJECTION INTRAMUSCULAR; INTRAVENOUS at 10:09

## 2019-11-06 RX ADMIN — PROPOFOL 690 MG: 10 INJECTION, EMULSION INTRAVENOUS at 10:03

## 2019-11-06 RX ADMIN — SODIUM CHLORIDE, POTASSIUM CHLORIDE, SODIUM LACTATE AND CALCIUM CHLORIDE: 600; 310; 30; 20 INJECTION, SOLUTION INTRAVENOUS at 09:46

## 2019-11-06 RX ADMIN — LIDOCAINE HYDROCHLORIDE 100 MG: 20 INJECTION, SOLUTION EPIDURAL; INFILTRATION; INTRACAUDAL; PERINEURAL at 10:03

## 2019-11-06 RX ADMIN — MIDAZOLAM 2 MG: 1 INJECTION INTRAMUSCULAR; INTRAVENOUS at 09:55

## 2019-11-06 ASSESSMENT — ENCOUNTER SYMPTOMS
COLOR CHANGE: 0
COUGH: 0
CONSTIPATION: 0
BACK PAIN: 1
SHORTNESS OF BREATH: 0
BLOOD IN STOOL: 0
ABDOMINAL PAIN: 0
WHEEZING: 0
NAUSEA: 1
DIARRHEA: 1
EYE PAIN: 0
VOMITING: 0

## 2019-11-06 ASSESSMENT — PAIN - FUNCTIONAL ASSESSMENT: PAIN_FUNCTIONAL_ASSESSMENT: 0-10

## 2019-11-06 ASSESSMENT — PAIN SCALES - GENERAL
PAINLEVEL_OUTOF10: 0
PAINLEVEL_OUTOF10: 0

## 2019-11-11 ENCOUNTER — OFFICE VISIT (OUTPATIENT)
Dept: GASTROENTEROLOGY | Age: 39
End: 2019-11-11
Payer: MEDICARE

## 2019-11-11 VITALS
BODY MASS INDEX: 34.72 KG/M2 | OXYGEN SATURATION: 99 % | DIASTOLIC BLOOD PRESSURE: 78 MMHG | WEIGHT: 216 LBS | SYSTOLIC BLOOD PRESSURE: 122 MMHG | HEART RATE: 100 BPM | HEIGHT: 66 IN

## 2019-11-11 DIAGNOSIS — K76.0 FATTY LIVER: ICD-10-CM

## 2019-11-11 DIAGNOSIS — R74.8 ELEVATED LIVER ENZYMES: Primary | ICD-10-CM

## 2019-11-11 DIAGNOSIS — K21.9 GASTROESOPHAGEAL REFLUX DISEASE WITHOUT ESOPHAGITIS: ICD-10-CM

## 2019-11-11 PROCEDURE — 1036F TOBACCO NON-USER: CPT | Performed by: NURSE PRACTITIONER

## 2019-11-11 PROCEDURE — G8417 CALC BMI ABV UP PARAM F/U: HCPCS | Performed by: NURSE PRACTITIONER

## 2019-11-11 PROCEDURE — G8484 FLU IMMUNIZE NO ADMIN: HCPCS | Performed by: NURSE PRACTITIONER

## 2019-11-11 PROCEDURE — G8427 DOCREV CUR MEDS BY ELIG CLIN: HCPCS | Performed by: NURSE PRACTITIONER

## 2019-11-11 PROCEDURE — 99214 OFFICE O/P EST MOD 30 MIN: CPT | Performed by: NURSE PRACTITIONER

## 2019-11-11 ASSESSMENT — ENCOUNTER SYMPTOMS
CONSTIPATION: 0
BACK PAIN: 1
NAUSEA: 0
ABDOMINAL PAIN: 0
SHORTNESS OF BREATH: 0
COUGH: 0
DIARRHEA: 0
WHEEZING: 0
EYE PAIN: 0
VOMITING: 0
COLOR CHANGE: 0
BLOOD IN STOOL: 0

## 2019-12-12 DIAGNOSIS — F32.A ANXIETY AND DEPRESSION: ICD-10-CM

## 2019-12-12 DIAGNOSIS — F41.9 ANXIETY AND DEPRESSION: ICD-10-CM

## 2019-12-12 RX ORDER — ESCITALOPRAM OXALATE 20 MG/1
20 TABLET ORAL DAILY
Qty: 30 TABLET | Refills: 2 | Status: SHIPPED | OUTPATIENT
Start: 2019-12-12 | End: 2020-03-16 | Stop reason: SDUPTHER

## 2020-02-12 ENCOUNTER — OFFICE VISIT (OUTPATIENT)
Dept: FAMILY MEDICINE CLINIC | Age: 40
End: 2020-02-12
Payer: MEDICARE

## 2020-02-12 LAB — HBA1C MFR BLD: 8.8 %

## 2020-02-12 PROCEDURE — G8417 CALC BMI ABV UP PARAM F/U: HCPCS | Performed by: NURSE PRACTITIONER

## 2020-02-12 PROCEDURE — 83036 HEMOGLOBIN GLYCOSYLATED A1C: CPT | Performed by: NURSE PRACTITIONER

## 2020-02-12 PROCEDURE — 99214 OFFICE O/P EST MOD 30 MIN: CPT | Performed by: NURSE PRACTITIONER

## 2020-02-12 PROCEDURE — 1036F TOBACCO NON-USER: CPT | Performed by: NURSE PRACTITIONER

## 2020-02-12 PROCEDURE — G8484 FLU IMMUNIZE NO ADMIN: HCPCS | Performed by: NURSE PRACTITIONER

## 2020-02-12 PROCEDURE — 2022F DILAT RTA XM EVC RTNOPTHY: CPT | Performed by: NURSE PRACTITIONER

## 2020-02-12 PROCEDURE — G8431 POS CLIN DEPRES SCRN F/U DOC: HCPCS | Performed by: NURSE PRACTITIONER

## 2020-02-12 PROCEDURE — 3052F HG A1C>EQUAL 8.0%<EQUAL 9.0%: CPT | Performed by: NURSE PRACTITIONER

## 2020-02-12 PROCEDURE — G8427 DOCREV CUR MEDS BY ELIG CLIN: HCPCS | Performed by: NURSE PRACTITIONER

## 2020-02-12 ASSESSMENT — PATIENT HEALTH QUESTIONNAIRE - PHQ9
SUM OF ALL RESPONSES TO PHQ9 QUESTIONS 1 & 2: 3
SUM OF ALL RESPONSES TO PHQ QUESTIONS 1-9: 19
5. POOR APPETITE OR OVEREATING: 2
7. TROUBLE CONCENTRATING ON THINGS, SUCH AS READING THE NEWSPAPER OR WATCHING TELEVISION: 3
1. LITTLE INTEREST OR PLEASURE IN DOING THINGS: 1
8. MOVING OR SPEAKING SO SLOWLY THAT OTHER PEOPLE COULD HAVE NOTICED. OR THE OPPOSITE, BEING SO FIGETY OR RESTLESS THAT YOU HAVE BEEN MOVING AROUND A LOT MORE THAN USUAL: 2
3. TROUBLE FALLING OR STAYING ASLEEP: 3
4. FEELING TIRED OR HAVING LITTLE ENERGY: 3
9. THOUGHTS THAT YOU WOULD BE BETTER OFF DEAD, OR OF HURTING YOURSELF: 1
10. IF YOU CHECKED OFF ANY PROBLEMS, HOW DIFFICULT HAVE THESE PROBLEMS MADE IT FOR YOU TO DO YOUR WORK, TAKE CARE OF THINGS AT HOME, OR GET ALONG WITH OTHER PEOPLE: 1
6. FEELING BAD ABOUT YOURSELF - OR THAT YOU ARE A FAILURE OR HAVE LET YOURSELF OR YOUR FAMILY DOWN: 2
2. FEELING DOWN, DEPRESSED OR HOPELESS: 2
SUM OF ALL RESPONSES TO PHQ QUESTIONS 1-9: 19

## 2020-02-12 ASSESSMENT — ENCOUNTER SYMPTOMS
BACK PAIN: 0
SHORTNESS OF BREATH: 0
ABDOMINAL DISTENTION: 0
NAUSEA: 0
VOMITING: 0

## 2020-02-12 ASSESSMENT — COLUMBIA-SUICIDE SEVERITY RATING SCALE - C-SSRS
6. HAVE YOU EVER DONE ANYTHING, STARTED TO DO ANYTHING, OR PREPARED TO DO ANYTHING TO END YOUR LIFE?: NO
1. WITHIN THE PAST MONTH, HAVE YOU WISHED YOU WERE DEAD OR WISHED YOU COULD GO TO SLEEP AND NOT WAKE UP?: NO
2. HAVE YOU ACTUALLY HAD ANY THOUGHTS OF KILLING YOURSELF?: NO

## 2020-02-12 NOTE — PROGRESS NOTES
activity: Not on file   Other Topics Concern    Not on file   Social History Narrative    Not on file     Family History   Problem Relation Age of Onset    Colon Cancer Neg Hx     Stomach Cancer Neg Hx      Past Surgical History:   Procedure Laterality Date    COLONOSCOPY  11/06/2019    polyp, repeat in 10 years    COLONOSCOPY N/A 11/6/2019    COLONOSCOPY POLYPECTOMY SNARE/COLD BIOPSY performed by Jaquan Hadley MD at [facility], DIAGNOSTIC  11/06/2019    biopsy, f/u with aman in 2 weeks    UPPER GASTROINTESTINAL ENDOSCOPY N/A 11/6/2019    EGD BIOPSY performed by Jaquan Hadley MD at [place] [address]        Review of Systems   Constitutional: Positive for fatigue. Negative for unexpected weight change. HENT: Negative for congestion. Respiratory: Negative for shortness of breath. Cardiovascular: Negative for chest pain, palpitations and leg swelling. Gastrointestinal: Negative for abdominal distention, nausea and vomiting. Musculoskeletal: Negative for back pain and gait problem. Skin:        Thinning hair   Neurological: Negative for dizziness, weakness and headaches. Psychiatric/Behavioral: Positive for dysphoric mood. Negative for agitation, sleep disturbance and suicidal ideas. The patient is not nervous/anxious. OBJECTIVE:  /80 (Site: Left Upper Arm, Position: Sitting, Cuff Size: Large Adult)   Pulse 109   Resp 16   Ht 5' 6\" (1.676 m)   Wt 213 lb 3.2 oz (96.7 kg)   LMP 01/10/2020 (Approximate)   SpO2 97%   BMI 34.41 kg/m²   BP Readings from Last 3 Encounters:   02/12/20 130/80   11/11/19 122/78   11/06/19 114/77     Wt Readings from Last 3 Encounters:   02/12/20 213 lb 3.2 oz (96.7 kg)   11/11/19 216 lb (98 kg)   11/06/19 213 lb (96.6 kg)     Body mass index is 34.41 kg/m². Physical Exam  Vitals signs and nursing note reviewed. Constitutional:       General: She is not in acute distress. Appearance: Normal appearance.  She is well-developed. She is obese. She is not ill-appearing or toxic-appearing. HENT:      Head: Normocephalic and atraumatic. Right Ear: External ear normal.      Left Ear: External ear normal.      Nose: Nose normal.   Eyes:      Conjunctiva/sclera: Conjunctivae normal.      Pupils: Pupils are equal, round, and reactive to light. Neck:      Musculoskeletal: Normal range of motion and neck supple. Cardiovascular:      Rate and Rhythm: Normal rate and regular rhythm. Heart sounds: Normal heart sounds. Pulmonary:      Effort: Pulmonary effort is normal.      Breath sounds: Normal breath sounds. Abdominal:      General: Bowel sounds are normal.      Palpations: Abdomen is soft. Musculoskeletal: Normal range of motion. Skin:     General: Skin is warm and dry. Capillary Refill: Capillary refill takes less than 2 seconds. Comments: No bald patches in scalp or obvious thinning noted of hair. Neurological:      Mental Status: She is alert and oriented to person, place, and time. Deep Tendon Reflexes: Reflexes are normal and symmetric. Psychiatric:         Behavior: Behavior normal.         Thought Content: Thought content normal.         Judgment: Judgment normal.         ASSESSMENT/PLAN:    1. Type 2 diabetes mellitus without complication, without long-term current use of insulin (HCC)  Continue current medication as directed  Encouraged to increase routine exercise as tolerated  Healthy diet, reduce carbs, and avoid sweets  - POCT glycosylated hemoglobin (Hb A1C)    2. Positive depression screening  Discussed options for medication change. Will continue with current treatment. Recommend counseling, patient declines. - Positive Screen for Clinical Depression with a Documented Follow-up Plan     3. Screening for HIV (human immunodeficiency virus)  - HIV-1 AND HIV-2 ANTIBODIES; Future    4.  Hair loss  No obvious changes  Will check TSH and testosterone previously

## 2020-02-12 NOTE — PATIENT INSTRUCTIONS
Ask your doctor how to safely stop using bupropion. If you take Zyban to help you stop smoking, you may continue to smoke for about 1 week after you start the medicine. Set a date to quit smoking during the second week of treatment. Talk to your doctor if you have trouble quitting after taking Zyban for 7 weeks. Your doctor may prescribe nicotine patches or gum to help you stop smoking. Do not smoke at any time if you are using a nicotine product along with Zyban. Too much nicotine can cause serious side effects. Read and carefully follow any Instructions for Use provided with your medicine. Ask your doctor or pharmacist if you do not understand these instructions. You may have nicotine withdrawal symptoms when you stop smoking, including: increased appetite, weight gain, trouble sleeping, trouble concentrating, slower heart rate, having the urge to smoke, and feeling anxious, restless, depressed, angry, frustrated, or irritated. These symptoms may occur with or without using medication such as Zyban. Smoking cessation may also cause new or worsening mental health problems, such as depression. This medicine may affect a drug-screening urine test and you may have false results. Tell the laboratory staff that you use bupropion. Store at room temperature away from moisture, heat, and light. What happens if I miss a dose? Skip the missed dose and use your next dose at the regular time. Do not use two doses at one time. What happens if I overdose? Seek emergency medical attention or call the Poison Help line at 1-319.668.5804. An overdose of bupropion can be fatal.  Overdose symptoms may include muscle stiffness, hallucinations, fast or uneven heartbeat, shallow breathing, or fainting. What should I avoid while taking bupropion? Drinking alcohol with bupropion may increase your risk of seizures. If you drink alcohol regularly, talk with your doctor before changing the amount you drink.  Bupropion can also

## 2020-02-14 ENCOUNTER — TELEPHONE (OUTPATIENT)
Dept: FAMILY MEDICINE CLINIC | Age: 40
End: 2020-02-14

## 2020-02-14 VITALS
BODY MASS INDEX: 34.27 KG/M2 | DIASTOLIC BLOOD PRESSURE: 80 MMHG | SYSTOLIC BLOOD PRESSURE: 130 MMHG | HEART RATE: 109 BPM | OXYGEN SATURATION: 97 % | RESPIRATION RATE: 16 BRPM | HEIGHT: 66 IN | WEIGHT: 213.2 LBS

## 2020-02-14 ASSESSMENT — ENCOUNTER SYMPTOMS: ROS SKIN COMMENTS: THINNING HAIR

## 2020-02-14 NOTE — TELEPHONE ENCOUNTER
----- Message from EMMA Torres CNP sent at 2/14/2020  3:15 PM EST -----  Lab work sent yesterday  Please add tsh and testosterone  Thank you  Notify lab, the orders are in

## 2020-03-16 RX ORDER — ESCITALOPRAM OXALATE 20 MG/1
20 TABLET ORAL DAILY
Qty: 30 TABLET | Refills: 2 | Status: SHIPPED | OUTPATIENT
Start: 2020-03-16 | End: 2020-05-19

## 2020-04-07 RX ORDER — OMEPRAZOLE 20 MG/1
20 CAPSULE, DELAYED RELEASE ORAL DAILY
Qty: 30 CAPSULE | Refills: 3 | Status: SHIPPED | OUTPATIENT
Start: 2020-04-07 | End: 2020-09-17 | Stop reason: SDUPTHER

## 2020-05-19 ENCOUNTER — OFFICE VISIT (OUTPATIENT)
Dept: FAMILY MEDICINE CLINIC | Age: 40
End: 2020-05-19
Payer: MEDICARE

## 2020-05-19 VITALS
WEIGHT: 213 LBS | OXYGEN SATURATION: 96 % | SYSTOLIC BLOOD PRESSURE: 122 MMHG | DIASTOLIC BLOOD PRESSURE: 82 MMHG | BODY MASS INDEX: 34.38 KG/M2 | HEART RATE: 111 BPM

## 2020-05-19 LAB
CREATININE URINE POCT: ABNORMAL
HBA1C MFR BLD: 10.9 %
MICROALBUMIN/CREAT 24H UR: ABNORMAL MG/G{CREAT}
MICROALBUMIN/CREAT UR-RTO: ABNORMAL

## 2020-05-19 PROCEDURE — 82044 UR ALBUMIN SEMIQUANTITATIVE: CPT | Performed by: NURSE PRACTITIONER

## 2020-05-19 PROCEDURE — 2022F DILAT RTA XM EVC RTNOPTHY: CPT | Performed by: NURSE PRACTITIONER

## 2020-05-19 PROCEDURE — 3046F HEMOGLOBIN A1C LEVEL >9.0%: CPT | Performed by: NURSE PRACTITIONER

## 2020-05-19 PROCEDURE — G8417 CALC BMI ABV UP PARAM F/U: HCPCS | Performed by: NURSE PRACTITIONER

## 2020-05-19 PROCEDURE — 1036F TOBACCO NON-USER: CPT | Performed by: NURSE PRACTITIONER

## 2020-05-19 PROCEDURE — 83036 HEMOGLOBIN GLYCOSYLATED A1C: CPT | Performed by: NURSE PRACTITIONER

## 2020-05-19 PROCEDURE — 99214 OFFICE O/P EST MOD 30 MIN: CPT | Performed by: NURSE PRACTITIONER

## 2020-05-19 PROCEDURE — G8427 DOCREV CUR MEDS BY ELIG CLIN: HCPCS | Performed by: NURSE PRACTITIONER

## 2020-05-19 RX ORDER — MULTIVIT-MIN/IRON FUM/FOLIC AC 7.5 MG-4
1 TABLET ORAL DAILY
Qty: 30 TABLET | Refills: 5 | Status: SHIPPED | OUTPATIENT
Start: 2020-05-19 | End: 2020-11-18

## 2020-05-19 RX ORDER — LANOLIN ALCOHOL/MO/W.PET/CERES
400 CREAM (GRAM) TOPICAL DAILY
Qty: 30 TABLET | Refills: 5 | Status: SHIPPED | OUTPATIENT
Start: 2020-05-19 | End: 2020-11-18

## 2020-05-19 RX ORDER — BUPROPION HYDROCHLORIDE 150 MG/1
150 TABLET, EXTENDED RELEASE ORAL EVERY 12 HOURS
Qty: 60 TABLET | Refills: 0 | Status: SHIPPED | OUTPATIENT
Start: 2020-05-19 | End: 2020-07-02

## 2020-05-19 RX ORDER — THIAMINE MONONITRATE (VIT B1) 100 MG
100 TABLET ORAL DAILY
Qty: 30 TABLET | Refills: 5 | Status: SHIPPED | OUTPATIENT
Start: 2020-05-19

## 2020-05-19 ASSESSMENT — ENCOUNTER SYMPTOMS
BACK PAIN: 0
NAUSEA: 0
ALLERGIC/IMMUNOLOGIC NEGATIVE: 1
SHORTNESS OF BREATH: 0
VOMITING: 0
EYES NEGATIVE: 1
ABDOMINAL DISTENTION: 0

## 2020-05-19 NOTE — PATIENT INSTRUCTIONS
I am committed to providing you the best care possible. If you receive a survey after visiting our office, please take time to share your experience concerning your office visit. These surveys are confidential and no health information about you is shared. I am eager to improve for you and I am counting on your feedback to help make that happen. Follow up with Dr Solange Tellez as scheduled      Patient Education        Learning About Alcohol Use Disorder  What is alcohol use disorder? Alcohol use disorder means that a person drinks alcohol even though it causes harm to themselves or others. It can range from mild to severe. The more signs of this disorder you have, the more severe it may be. Moderate to severe alcohol use disorder is sometimes called addiction. People who have it may find it hard to control their use of alcohol. People who have this disorder may argue with others about how much they're drinking. Their job may be affected because of drinking. They may drink when it's dangerous or illegal, such as when they drive. They also may have a strong need, or craving, to drink. They may feel like they must drink just to get by. Their drinking may increase their risk of getting hurt or being in a car crash. Over time, drinking too much alcohol may cause health problems. These may include high blood pressure, liver problems, or problems with digestion. What are the signs? Maybe you've wondered about your alcohol habits, or how to tell if your drinking is becoming a problem. Here are some of the signs of alcohol use disorder. You may have it if you have two or more of the following signs:  · You drink larger amounts of alcohol than you ever meant to. Or you've been drinking for a longer time than you ever meant to. · You can't cut down or control your use. Or you constantly wish you could cut down. · You spend a lot of time getting or drinking alcohol or recovering from its effects.   · You have counselors, doctors, social workers, nurses, and a . A  helps plan and manage your treatment. Follow-up care is a key part of your treatment and safety. Be sure to make and go to all appointments, and call your doctor if you are having problems. It's also a good idea to know your test results and keep a list of the medicines you take. Where can you learn more? Go to https://chpepiceweb.[employer]. org and sign in to your [employer] account. Enter [id number] in the KRAFTWERK box to learn more about \"Learning About Alcohol Use Disorder. \"     If you do not have an account, please click on the \"Sign Up Now\" link. Current as of: August 21, 2019Content Version: 12.4  © 8264-1554 Healthwise, Incorporated. Care instructions adapted under license by [facility] ([facility]). If you have questions about a medical condition or this instruction, always ask your healthcare professional. [address] any warranty or liability for your use of this information.

## 2020-05-19 NOTE — PROGRESS NOTES
SUBJECTIVE:  Colleen Delay   1980   female   No Known Allergies    Chief Complaint   Patient presents with    3 Month Follow-Up    Diabetes    Depression     meds are not working well           HPI   Continues with no sex drive  Depression medicine not really working  Blood sugar is running \"high\"  Following with Dr Mario Robertson for her blood sugar, next appt is in about 2 months  Continue to drink alcohol every 1-2 weeks \"binges\"        Past Medical History:   Diagnosis Date    Diabetes mellitus ([facility] [address]     Headache     Hyperlipidemia      Social History     Socioeconomic History    Marital status: Single     Spouse name: Not on file    Number of children: Not on file    Years of education: Not on file    Highest education level: Not on file   Occupational History    Not on file   Social Needs    Financial resource strain: Not on file    Food insecurity     Worry: Not on file     Inability: Not on file    Transportation needs     Medical: Not on file     Non-medical: Not on file   Tobacco Use    Smoking status: Never Smoker    Smokeless tobacco: Never Used   Substance and Sexual Activity    Alcohol use: Yes     Frequency: 4 or more times a week     Drinks per session: Patient refused     Binge frequency: Daily or almost daily    Drug use: No     Comment: occasionally    Sexual activity: Yes     Partners: Male   Lifestyle    Physical activity     Days per week: Not on file     Minutes per session: Not on file    Stress: Not on file   Relationships    Social connections     Talks on phone: Not on file     Gets together: Not on file     Attends Judaism service: Not on file     Active member of club or organization: Not on file     Attends meetings of clubs or organizations: Not on file     Relationship status: Not on file    Intimate partner violence     Fear of current or ex partner: Not on file     Emotionally abused: Not on file     Physically abused: Not on file     Forced sexual activity: FOOT EXAM    2. Moderate episode of recurrent major depressive disorder ([facility] [address]  Discontinue lexapro  Start:  - buPROPion (WELLBUTRIN SR) 150 MG extended release tablet; Take 1 tablet by mouth every 12 hours Last dose before 6 pm  Dispense: 60 tablet; Refill: 0    3. Hyperlipidemia, unspecified hyperlipidemia type  Continue atorvastatin as directed  - LIPID PANEL    4. Hair loss  - Testosterone, free, total  - TSH WITH REFLEX TO FT4    5. Screening for HIV (human immunodeficiency virus)  - HIV-1 AND HIV-2 ANTIBODIES    6.  Alcohol abuse  Thiamine, folic acid, multivitamin as directed  Encouraged to abstain  Offered suggestions for [employer], counseling, patient refuses all    Orders Placed This Encounter   Procedures    LIPID PANEL     Order Specific Question:   Is Patient Fasting?/# of Hours     Answer:   12    POCT glycosylated hemoglobin (Hb A1C)    POCT microalbumin    HM DIABETES FOOT EXAM     Current Outpatient Medications   Medication Sig Dispense Refill    buPROPion (WELLBUTRIN SR) 150 MG extended release tablet Take 1 tablet by mouth every 12 hours Last dose before 6 pm 60 tablet 0    Multiple Vitamins-Minerals (MULTIVITAMIN WITH MINERALS) tablet Take 1 tablet by mouth daily 30 tablet 5    vitamin B-1 (THIAMINE) 100 MG tablet Take 1 tablet by mouth daily 30 tablet 5    folic acid (V-R FOLIC ACID) 640 MCG tablet Take 1 tablet by mouth daily 30 tablet 5    omeprazole (PRILOSEC) 20 MG delayed release capsule Take 1 capsule by mouth Daily 30 capsule 3    atorvastatin (LIPITOR) 20 MG tablet TAKE 1 TABLET BY MOUTH EVERY DAY (Patient taking differently: 20 mg every morning ) 30 tablet 5    HUMALOG KWIKPEN 100 UNIT/ML pen       JANUVIA 100 MG tablet 100 mg every morning       Blood Glucose Monitoring Suppl (TRUE METRIX METER) w/Device KIT 1 KIT BY DOES NOT APPLY ROUTE DAILY  0    blood glucose monitor strips Check blood glucose AC and  strip 3    Lancets MISC Check blood glucose AC and HS

## 2020-07-21 ENCOUNTER — TELEPHONE (OUTPATIENT)
Dept: BARIATRICS/WEIGHT MGMT | Age: 40
End: 2020-07-21

## 2020-09-11 ENCOUNTER — OFFICE VISIT (OUTPATIENT)
Dept: FAMILY MEDICINE CLINIC | Age: 40
End: 2020-09-11
Payer: MEDICARE

## 2020-09-11 VITALS
SYSTOLIC BLOOD PRESSURE: 120 MMHG | BODY MASS INDEX: 33.28 KG/M2 | OXYGEN SATURATION: 98 % | WEIGHT: 206.2 LBS | TEMPERATURE: 96.8 F | HEART RATE: 112 BPM | DIASTOLIC BLOOD PRESSURE: 72 MMHG

## 2020-09-11 LAB
BILIRUBIN, POC: NEGATIVE
BLOOD URINE, POC: NEGATIVE
CLARITY, POC: CLEAR
COLOR, POC: YELLOW
CONTROL: NORMAL
GLUCOSE URINE, POC: ABNORMAL
KETONES, POC: NEGATIVE
LEUKOCYTE EST, POC: NEGATIVE
NITRITE, POC: NEGATIVE
PH, POC: 5.5
PREGNANCY TEST URINE, POC: NORMAL
PROTEIN, POC: NEGATIVE
SPECIFIC GRAVITY, POC: 1.01
UROBILINOGEN, POC: ABNORMAL

## 2020-09-11 PROCEDURE — G8427 DOCREV CUR MEDS BY ELIG CLIN: HCPCS | Performed by: PHYSICIAN ASSISTANT

## 2020-09-11 PROCEDURE — 1036F TOBACCO NON-USER: CPT | Performed by: PHYSICIAN ASSISTANT

## 2020-09-11 PROCEDURE — 99213 OFFICE O/P EST LOW 20 MIN: CPT | Performed by: PHYSICIAN ASSISTANT

## 2020-09-11 PROCEDURE — G8417 CALC BMI ABV UP PARAM F/U: HCPCS | Performed by: PHYSICIAN ASSISTANT

## 2020-09-11 PROCEDURE — 81025 URINE PREGNANCY TEST: CPT | Performed by: PHYSICIAN ASSISTANT

## 2020-09-11 RX ORDER — METHYLPREDNISOLONE 4 MG/1
TABLET ORAL
Qty: 1 KIT | Refills: 0 | Status: SHIPPED | OUTPATIENT
Start: 2020-09-11 | End: 2020-11-16

## 2020-09-11 RX ORDER — CYCLOBENZAPRINE HCL 10 MG
10 TABLET ORAL 3 TIMES DAILY PRN
Qty: 30 TABLET | Refills: 0 | Status: SHIPPED | OUTPATIENT
Start: 2020-09-11 | End: 2020-09-21

## 2020-09-11 RX ORDER — IBUPROFEN 800 MG/1
800 TABLET ORAL EVERY 8 HOURS PRN
Qty: 60 TABLET | Refills: 0 | Status: SHIPPED | OUTPATIENT
Start: 2020-09-11 | End: 2020-11-16

## 2020-09-11 NOTE — PATIENT INSTRUCTIONS
Patient Education        Back Strain: Care Instructions  Overview     A back strain happens when you overstretch, or pull, a muscle in your back. You may hurt your back in an accident or when you exercise or lift something. Sometimes you may not know how you hurt your back. Most back pain will get better with rest and time. You can take care of yourself at home to help your back heal.  Follow-up care is a key part of your treatment and safety. Be sure to make and go to all appointments, and call your doctor if you are having problems. It's also a good idea to know your test results and keep a list of the medicines you take. How can you care for yourself at home? · Try to stay as active as you can, but stop or reduce any activity that causes pain. · Put ice or a cold pack on the sore muscle for 10 to 20 minutes at a time to stop swelling. Try this every 1 to 2 hours for 3 days (when you are awake) or until the swelling goes down. Put a thin cloth between the ice pack and your skin. · After 2 or 3 days, apply a heating pad on low or a warm cloth to your back. Some doctors suggest that you go back and forth between hot and cold treatments. · Take pain medicines exactly as directed. ? If the doctor gave you a prescription medicine for pain, take it as prescribed. ? If you are not taking a prescription pain medicine, ask your doctor if you can take an over-the-counter medicine. · Try sleeping on your side with a pillow between your legs. Or put a pillow under your knees when you lie on your back. These measures can ease pain in your lower back. · Return to your usual level of activity slowly. When should you call for help? [id number] anytime you think you may need emergency care. For example, call if:  · You are unable to move a leg at all. Call your doctor now or seek immediate medical care if:  · You have new or worse symptoms in your legs, belly, or buttocks.  Symptoms may include:  ? Numbness or tingling. ? Weakness. ? Pain. · You lose bladder or bowel control. Watch closely for changes in your health, and be sure to contact your doctor if:  · You have a fever, lose weight, or don't feel well. · You are not getting better as expected. Where can you learn more? Go to https://chpepiceweb.[employer]. org and sign in to your [employer] account. Enter F852 in the My Hood box to learn more about \"Back Strain: Care Instructions. \"     If you do not have an account, please click on the \"Sign Up Now\" link. Current as of: March 2, 2020               Content Version: 12.5  © 2006-2020 Healthwise, Incorporated. Care instructions adapted under license by [facility] ([facility]). If you have questions about a medical condition or this instruction, always ask your healthcare professional. [address] any warranty or liability for your use of this information.

## 2020-09-11 NOTE — PROGRESS NOTES
9/11/2020    Thalia Alejandro    Chief Complaint   Patient presents with    Flank Pain     right side moving to left        HPI  History was obtained from patient. Louise Barthel is a 46 y.o. female who presents today with complaints of pain near right flank. 2 days ago, she went to sit down in a chair and felt a \"pinch\" in this area of her back. She has felt \"cramping and spasming\" type pain since then. Any movement or twisting exacerbates the pain. There is no pain in the buttocks or legs. Denies numbness, tingling, saddle anesthesia, bowel or bladder incontinence. Denies urinary frequency, urgency, dysuria, hematuria, or unusual vaginal discharge or bleeding. LNMP was 5 weeks ago. She has irregular menstrual cycles. She requested a urine pregnancy test today which was negative. No prior back problems. No hx of kidney stones. REVIEW OF SYMPTOMS    Constitutional:  Denies fever, chills, or weakness  Cardiovascular:  Denies chest pain, palpitations or swelling  Respiratory:  Denies cough or shortness of breath  GI:  Denies abdominal pain, nausea, vomiting, or diarrhea  Musculoskeletal: Admits right-sided back pain near the flank. See HPI.   Skin:  No rashes  Neurologic:  Denies headache, focal weakness, or sensory changes  Lymphatic:  Denies swollen glands    PAST MEDICAL HISTORY  Past Medical History:   Diagnosis Date    Diabetes mellitus ([facility] [address]     Headache     Hyperlipidemia        FAMILY HISTORY  Family History   Problem Relation Age of Onset    Colon Cancer Neg Hx     Stomach Cancer Neg Hx        SOCIAL HISTORY  Social History     Socioeconomic History    Marital status: Single     Spouse name: None    Number of children: None    Years of education: None    Highest education level: None   Occupational History    None   Social Needs    Financial resource strain: None    Food insecurity     Worry: None     Inability: None    Transportation needs     Medical: None     Non-medical: None Tobacco Use    Smoking status: Never Smoker    Smokeless tobacco: Never Used   Substance and Sexual Activity    Alcohol use: Yes     Frequency: 4 or more times a week     Drinks per session: Patient refused     Binge frequency: Daily or almost daily    Drug use: No     Comment: occasionally    Sexual activity: Yes     Partners: Male   Lifestyle    Physical activity     Days per week: None     Minutes per session: None    Stress: None   Relationships    Social connections     Talks on phone: None     Gets together: None     Attends Anabaptism service: None     Active member of club or organization: None     Attends meetings of clubs or organizations: None     Relationship status: None    Intimate partner violence     Fear of current or ex partner: None     Emotionally abused: None     Physically abused: None     Forced sexual activity: None   Other Topics Concern    None   Social History Narrative    None        SURGICAL HISTORY  Past Surgical History:   Procedure Laterality Date    COLONOSCOPY  11/06/2019    polyp, repeat in 10 years    COLONOSCOPY N/A 11/6/2019    COLONOSCOPY POLYPECTOMY SNARE/COLD BIOPSY performed by Vaibhav Beyer MD at [facility], COLON, DIAGNOSTIC  11/06/2019    biopsy, f/u with aman in 2 weeks    UPPER GASTROINTESTINAL ENDOSCOPY N/A 11/6/2019    EGD BIOPSY performed by Vaibhav Beyer MD at [address]  Current Outpatient Medications   Medication Sig Dispense Refill    cyclobenzaprine (FLEXERIL) 10 MG tablet Take 1 tablet by mouth 3 times daily as needed for Muscle spasms 30 tablet 0    methylPREDNISolone (MEDROL, JEFFREY,) 4 MG tablet Use as directed 1 kit 0    ibuprofen (ADVIL;MOTRIN) 800 MG tablet Take 1 tablet by mouth every 8 hours as needed for Pain 60 tablet 0    buPROPion (WELLBUTRIN SR) 150 MG extended release tablet TAKE 1 TABLET BY MOUTH EVERY 12 HOURS LAST DOSE BEFORE 6 PM 60 tablet 2    Multiple Vitamins-Minerals (MULTIVITAMIN WITH MINERALS) tablet Take 1 tablet by mouth daily 30 tablet 5    vitamin B-1 (THIAMINE) 100 MG tablet Take 1 tablet by mouth daily 30 tablet 5    folic acid (V-R FOLIC ACID) 438 MCG tablet Take 1 tablet by mouth daily 30 tablet 5    omeprazole (PRILOSEC) 20 MG delayed release capsule Take 1 capsule by mouth Daily 30 capsule 3    atorvastatin (LIPITOR) 20 MG tablet TAKE 1 TABLET BY MOUTH EVERY DAY (Patient taking differently: 20 mg every morning ) 30 tablet 5    HUMALOG KWIKPEN 100 UNIT/ML pen       JANUVIA 100 MG tablet 100 mg every morning       Blood Glucose Monitoring Suppl (TRUE METRIX METER) w/Device KIT 1 KIT BY DOES NOT APPLY ROUTE DAILY  0    blood glucose monitor strips Check blood glucose AC and  strip 3    Lancets MISC Check blood glucose AC and  each 3    metFORMIN (GLUCOPHAGE) 500 MG tablet Take 1 tablet by mouth 2 times daily (with meals) (Patient taking differently: Take 500 mg by mouth 5 times daily Indications: pt reports taking 2 tabs in a.m., 2 tabs in p.m. ) 60 tablet 0    acetaminophen (TYLENOL) 500 MG tablet Take 1,000 mg by mouth every 6 hours as needed.  insulin glargine (BASAGLAR KWIKPEN) 100 UNIT/ML injection pen Inject 10 Units into the skin nightly (Patient taking differently: Inject 40 Units into the skin as needed ) 5 pen 3    Insulin Pen Needle 31G X 5 MM MISC 1 each by Does not apply route daily 100 each 3     No current facility-administered medications for this visit.         ALLERGIES  No Known Allergies    PHYSICAL EXAM    /72   Pulse 112   Temp 96.8 °F (36 °C) (Temporal)   Wt 206 lb 3.2 oz (93.5 kg)   SpO2 98%   Breastfeeding No   BMI 33.28 kg/m²     Constitutional:  Well developed, well nourished  HENT:  Normocephalic, atraumatic  Eyes:  conjunctiva normal, no discharge, no scleral icterus  Neck:  Normal range of motion, no tenderness  Cardiovascular:  Normal heart rate, normal rhythm, no murmurs, gallops or rubs  Thorax & Lungs:  Normal breath sounds, no respiratory distress, no wheezing  Abdomen:  Soft, no tenderness, no masses, no pulsatile masses, not distended, bowel sounds normal  Skin:  Warm, dry, no erythema, no rash  Back: There is tenderness to palpation T12 to L3, paraspinous regions. There is no spinal pain. No step-off or other deformity. Full lumbar and thoracic range of motion but this does induce mild to moderate pain in that localized area. Neurologic:  Alert & oriented X 3, normal motor function, normal sensory function, no focal deficits noted  Psychiatric:  Affect normal, mood normal    ASSESSMENT & PLAN    Marisa Hill was seen today for flank pain. Diagnoses and all orders for this visit:    Lumbar strain, initial encounter  -     cyclobenzaprine (FLEXERIL) 10 MG tablet; Take 1 tablet by mouth 3 times daily as needed for Muscle spasms  -     methylPREDNISolone (MEDROL, JEFFREY,) 4 MG tablet; Use as directed  -     ibuprofen (ADVIL;MOTRIN) 800 MG tablet; Take 1 tablet by mouth every 8 hours as needed for Pain    Acute flank pain  -     POCT Urinalysis no Micro    Possible pregnancy  -     POCT urine pregnancy    Glucosuria    UA with 500 mg/DL glucose, but otherwise normal.  No UTI symptoms. Patient and I discussed her type 2 diabetes in great detail. She is a noncompliant diabetic. She was not interested in any education today. We discusse the many risks of uncontrolled diabetes. Urine pregnancy test negative. We will treat lumbar strain with Medrol Dosepak, ibuprofen, and Flexeril as noted above. We discussed possible side effects of these medications in detail and patient voiced understanding and wishes to continue. She was encouraged to apply intermittent application of ice to the affected area. Never apply ice directly to the skin. After 3 days, switch to a heating pad. We discussed stretches to start. Patient is to follow-up with her primary care provider.     Medications Discontinued During This Encounter   Medication Reason    ibuprofen (ADVIL;MOTRIN) 400 MG tablet LIST CLEANUP        No follow-ups on file. Plan of care reviewed with patient who verbalizes understanding and wishes to continue. Patient verbalizes understanding with the above plan and is in agreement. Patient will call with  worsening of symptoms, questions or concerns. Please note that this chart was generated using dragon dictation software. Although every effort was made to ensure the accuracy of this automated transcription, some errors in transcription may have occurred.     Electronically signed by Hannah Roque PA-C on 9/11/2020

## 2020-09-17 RX ORDER — OMEPRAZOLE 20 MG/1
20 CAPSULE, DELAYED RELEASE ORAL DAILY
Qty: 30 CAPSULE | Refills: 3 | Status: SHIPPED | OUTPATIENT
Start: 2020-09-17 | End: 2021-01-12 | Stop reason: SDUPTHER

## 2020-11-16 ENCOUNTER — OFFICE VISIT (OUTPATIENT)
Dept: FAMILY MEDICINE CLINIC | Age: 40
End: 2020-11-16
Payer: MEDICARE

## 2020-11-16 VITALS
OXYGEN SATURATION: 98 % | DIASTOLIC BLOOD PRESSURE: 80 MMHG | HEIGHT: 66 IN | BODY MASS INDEX: 33.04 KG/M2 | WEIGHT: 205.6 LBS | SYSTOLIC BLOOD PRESSURE: 124 MMHG | HEART RATE: 91 BPM

## 2020-11-16 PROBLEM — Z79.4 TYPE 2 DIABETES MELLITUS WITHOUT COMPLICATION, WITH LONG-TERM CURRENT USE OF INSULIN (HCC): Status: ACTIVE | Noted: 2020-11-16

## 2020-11-16 PROBLEM — E11.9 TYPE 2 DIABETES MELLITUS WITHOUT COMPLICATION, WITH LONG-TERM CURRENT USE OF INSULIN (HCC): Status: ACTIVE | Noted: 2020-11-16

## 2020-11-16 PROBLEM — E78.5 HYPERLIPIDEMIA: Status: ACTIVE | Noted: 2020-11-16

## 2020-11-16 PROBLEM — F10.10 ALCOHOL ABUSE: Status: ACTIVE | Noted: 2020-11-16

## 2020-11-16 PROBLEM — F51.01 PRIMARY INSOMNIA: Status: ACTIVE | Noted: 2020-11-16

## 2020-11-16 PROCEDURE — 2022F DILAT RTA XM EVC RTNOPTHY: CPT | Performed by: FAMILY MEDICINE

## 2020-11-16 PROCEDURE — 3046F HEMOGLOBIN A1C LEVEL >9.0%: CPT | Performed by: FAMILY MEDICINE

## 2020-11-16 PROCEDURE — G8417 CALC BMI ABV UP PARAM F/U: HCPCS | Performed by: FAMILY MEDICINE

## 2020-11-16 PROCEDURE — G8484 FLU IMMUNIZE NO ADMIN: HCPCS | Performed by: FAMILY MEDICINE

## 2020-11-16 PROCEDURE — 1036F TOBACCO NON-USER: CPT | Performed by: FAMILY MEDICINE

## 2020-11-16 PROCEDURE — 99214 OFFICE O/P EST MOD 30 MIN: CPT | Performed by: FAMILY MEDICINE

## 2020-11-16 PROCEDURE — G8427 DOCREV CUR MEDS BY ELIG CLIN: HCPCS | Performed by: FAMILY MEDICINE

## 2020-11-16 RX ORDER — TRAZODONE HYDROCHLORIDE 50 MG/1
50 TABLET ORAL NIGHTLY PRN
Qty: 30 TABLET | Refills: 5 | Status: SHIPPED | OUTPATIENT
Start: 2020-11-16 | End: 2021-02-17

## 2020-11-16 ASSESSMENT — ENCOUNTER SYMPTOMS
SHORTNESS OF BREATH: 0
SINUS PRESSURE: 0
DIARRHEA: 0
CONSTIPATION: 0
ABDOMINAL PAIN: 0
CHEST TIGHTNESS: 0
BACK PAIN: 0
WHEEZING: 0
SORE THROAT: 0
COUGH: 0

## 2020-11-16 NOTE — PROGRESS NOTES
Ca Mcnulty  1980 11/16/20    Chief Complaint   Patient presents with   Jaclyn Hughes Doctor     Patient here to establish care with PCP           46years old lady with past medical history of diabetes, hyperlipidemia, insomnia, and GERD. Came today to establish with us, patient does see Dr. Jeff Pathak for her diabetes, her A1c last time was last May was 10.9. She is taking cholesterol medication with no side effect, GERD under control, patient is starts her sleeping problem not under control, she does not sleep very well, does try daily Benadryl, melatonin but did not help, and also taking the Wellbutrin for her anxiety, it did not help her too.       Past Medical History:   Diagnosis Date    Diabetes mellitus ([facility] [address]     Headache     Hyperlipidemia      Past Surgical History:   Procedure Laterality Date    COLONOSCOPY  11/06/2019    polyp, repeat in 10 years    COLONOSCOPY N/A 11/6/2019    COLONOSCOPY POLYPECTOMY SNARE/COLD BIOPSY performed by Farida Silver MD at [place] [address], DIAGNOSTIC  11/06/2019    biopsy, f/u with aman in 2 weeks    UPPER GASTROINTESTINAL ENDOSCOPY N/A 11/6/2019    EGD BIOPSY performed by Farida Silver MD at [place] [id number]     Family History   Problem Relation Age of Onset    Colon Cancer Neg Hx     Stomach Cancer Neg Hx      Social History     Socioeconomic History    Marital status: Single     Spouse name: Not on file    Number of children: Not on file    Years of education: Not on file    Highest education level: Not on file   Occupational History    Not on file   Social Needs    Financial resource strain: Not on file    Food insecurity     Worry: Not on file     Inability: Not on file    Transportation needs     Medical: Not on file     Non-medical: Not on file   Tobacco Use    Smoking status: Never Smoker    Smokeless tobacco: Never Used   Substance and Sexual Activity    Alcohol use: Yes     Frequency: 4 or more times a week     Drinks per session: Patient refused     Binge frequency: Daily or almost daily    Drug use: No     Comment: occasionally    Sexual activity: Yes     Partners: Male   Lifestyle    Physical activity     Days per week: Not on file     Minutes per session: Not on file    Stress: Not on file   Relationships    Social connections     Talks on phone: Not on file     Gets together: Not on file     Attends Voodoo service: Not on file     Active member of club or organization: Not on file     Attends meetings of clubs or organizations: Not on file     Relationship status: Not on file    Intimate partner violence     Fear of current or ex partner: Not on file     Emotionally abused: Not on file     Physically abused: Not on file     Forced sexual activity: Not on file   Other Topics Concern    Not on file   Social History Narrative    Not on file       No Known Allergies  Current Outpatient Medications   Medication Sig Dispense Refill    traZODone (DESYREL) 50 MG tablet Take 1 tablet by mouth nightly as needed for Sleep 30 tablet 5    omeprazole (PRILOSEC) 20 MG delayed release capsule Take 1 capsule by mouth Daily 30 capsule 3    Multiple Vitamins-Minerals (MULTIVITAMIN WITH MINERALS) tablet Take 1 tablet by mouth daily 30 tablet 5    vitamin B-1 (THIAMINE) 100 MG tablet Take 1 tablet by mouth daily 30 tablet 5    folic acid (V-R FOLIC ACID) 733 MCG tablet Take 1 tablet by mouth daily 30 tablet 5    atorvastatin (LIPITOR) 20 MG tablet TAKE 1 TABLET BY MOUTH EVERY DAY (Patient taking differently: 20 mg every morning ) 30 tablet 5    HUMALOG KWIKPEN 100 UNIT/ML pen       JANUVIA 100 MG tablet 100 mg every morning       Blood Glucose Monitoring Suppl (TRUE METRIX METER) w/Device KIT 1 KIT BY DOES NOT APPLY ROUTE DAILY  0    blood glucose monitor strips Check blood glucose AC and  strip 3    Lancets MISC Check blood glucose AC and  each 3    metFORMIN (GLUCOPHAGE) 500 MG tablet Take 1 tablet by mouth 2 times daily (with meals) (Patient taking differently: Take 500 mg by mouth 5 times daily Indications: pt reports taking 2 tabs in a.m., 2 tabs in p.m. ) 60 tablet 0    Insulin Pen Needle 31G X 5 MM MISC 1 each by Does not apply route daily 100 each 3    acetaminophen (TYLENOL) 500 MG tablet Take 1,000 mg by mouth every 6 hours as needed. No current facility-administered medications for this visit. Review of Systems   Constitutional: Negative for activity change, appetite change, chills, fatigue and fever. HENT: Negative for congestion, postnasal drip, sinus pressure and sore throat. Respiratory: Negative for cough, chest tightness, shortness of breath and wheezing. Cardiovascular: Negative for chest pain and leg swelling. Gastrointestinal: Negative for abdominal pain, constipation and diarrhea. Genitourinary: Negative for dysuria and frequency. Musculoskeletal: Negative for back pain and gait problem. Skin: Negative for rash. Neurological: Negative for dizziness, weakness and headaches. Psychiatric/Behavioral: Positive for agitation and sleep disturbance. Negative for behavioral problems, hallucinations, self-injury and suicidal ideas. The patient is nervous/anxious.         Lab Results   Component Value Date    WBC 9.8 08/07/2019    HGB 12.8 08/07/2019    HCT 39.9 08/07/2019    MCV 92.8 08/07/2019     08/07/2019     Lab Results   Component Value Date     (L) 03/18/2019    K 4.7 03/18/2019    CL 98 (L) 03/18/2019    CO2 22 03/18/2019    BUN 8 03/18/2019    CREATININE 0.6 03/18/2019    GLUCOSE 221 (H) 03/18/2019    CALCIUM 9.4 03/18/2019    PROT 7.4 08/07/2019    LABALBU 4.4 08/07/2019    BILITOT 0.2 08/07/2019    ALKPHOS 92 08/07/2019    AST 79 (H) 08/07/2019     (H) 08/07/2019    LABGLOM >60 03/18/2019    GFRAA >60 03/18/2019     Lab Results   Component Value Date    CHOL 198 03/18/2019    CHOL 274 (H) 10/01/2018     Lab Results   Component Value Date    TRIG 91 03/18/2019    TRIG 142 10/01/2018     Lab Results   Component Value Date    HDL 55 03/18/2019    HDL 52 10/01/2018     No results found for: LDLCALC, LDLCHOLESTEROL  Lab Results   Component Value Date    LABA1C 10.9 05/19/2020     Lab Results   Component Value Date    TSHHS 2.100 10/01/2018         /80 (Site: Left Upper Arm, Position: Sitting, Cuff Size: Large Adult)   Pulse 91   Ht 5' 6\" (1.676 m)   Wt 205 lb 9.6 oz (93.3 kg)   LMP 11/03/2020 (Approximate)   SpO2 98%   BMI 33.18 kg/m²     BP Readings from Last 3 Encounters:   11/16/20 124/80   09/11/20 120/72   05/19/20 122/82       Wt Readings from Last 3 Encounters:   11/16/20 205 lb 9.6 oz (93.3 kg)   09/11/20 206 lb 3.2 oz (93.5 kg)   05/19/20 213 lb (96.6 kg)         Physical Exam  Constitutional:       General: She is not in acute distress. Appearance: She is well-developed. She is not diaphoretic. HENT:      Head: Normocephalic and atraumatic. Eyes:      General: No scleral icterus. Pupils: Pupils are equal, round, and reactive to light. Neck:      Musculoskeletal: Normal range of motion and neck supple. Cardiovascular:      Rate and Rhythm: Normal rate and regular rhythm. Heart sounds: Normal heart sounds. No murmur. Pulmonary:      Effort: Pulmonary effort is normal.      Breath sounds: Normal breath sounds. No wheezing. Musculoskeletal: Normal range of motion. Right lower leg: No edema. Left lower leg: No edema. Neurological:      General: No focal deficit present. Mental Status: She is alert and oriented to person, place, and time. Psychiatric:         Mood and Affect: Mood normal.         Behavior: Behavior normal.         Thought Content: Thought content normal.         Judgment: Judgment normal.         ASSESSMENT/ PLAN:    1.  Type 2 diabetes mellitus without complication, with long-term current use of insulin ([address]  - f/u with the endocrinology  - Diabetic Education - [place]  - CBC Auto Differential; Future  - Comprehensive Metabolic Panel, Fasting; Future  - Vitamin B12 & Folate; Future  - Vitamin D 25 Hydroxy; Future  - Hemoglobin A1C; Future    2. Hyperlipidemia, unspecified hyperlipidemia type  - stable  - Lipid Panel; Future  - T4, Free; Future  - TSH without Reflex; Future  - CBC Auto Differential; Future  - Comprehensive Metabolic Panel, Fasting; Future    3. Gastroesophageal reflux disease without esophagitis  - stable    4. Alcohol abuse  - cut down the alcohole    5. Primary insomnia  - start:  - traZODone (DESYREL) 50 MG tablet; Take 1 tablet by mouth nightly as needed for Sleep  Dispense: 30 tablet; Refill: 11              - All old blood work reviewed with the patient  - Appropriate prescription are addressed. - After visit summery provided. - Questions answered and patient verbalizes understanding.  - Call for any problem, questions, or concerns. Return in about 8 weeks (around 1/11/2021).

## 2020-12-03 RX ORDER — ATORVASTATIN CALCIUM 20 MG/1
TABLET, FILM COATED ORAL
Qty: 30 TABLET | Refills: 5 | Status: SHIPPED | OUTPATIENT
Start: 2020-12-03 | End: 2021-04-29

## 2020-12-17 ENCOUNTER — TELEPHONE (OUTPATIENT)
Dept: FAMILY MEDICINE CLINIC | Age: 40
End: 2020-12-17

## 2021-01-12 RX ORDER — OMEPRAZOLE 20 MG/1
20 CAPSULE, DELAYED RELEASE ORAL DAILY
Qty: 30 CAPSULE | Refills: 3 | Status: SHIPPED | OUTPATIENT
Start: 2021-01-12 | End: 2021-01-15 | Stop reason: SDUPTHER

## 2021-01-15 RX ORDER — OMEPRAZOLE 20 MG/1
20 CAPSULE, DELAYED RELEASE ORAL DAILY
Qty: 30 CAPSULE | Refills: 3 | Status: SHIPPED | OUTPATIENT
Start: 2021-01-15 | End: 2021-07-06 | Stop reason: SDUPTHER

## 2021-02-17 ENCOUNTER — OFFICE VISIT (OUTPATIENT)
Dept: FAMILY MEDICINE CLINIC | Age: 41
End: 2021-02-17

## 2021-02-17 VITALS
HEART RATE: 91 BPM | BODY MASS INDEX: 31.96 KG/M2 | SYSTOLIC BLOOD PRESSURE: 114 MMHG | DIASTOLIC BLOOD PRESSURE: 78 MMHG | WEIGHT: 198 LBS | OXYGEN SATURATION: 98 %

## 2021-02-17 DIAGNOSIS — F32.A ANXIETY AND DEPRESSION: Primary | ICD-10-CM

## 2021-02-17 DIAGNOSIS — F51.01 PRIMARY INSOMNIA: ICD-10-CM

## 2021-02-17 DIAGNOSIS — F41.9 ANXIETY AND DEPRESSION: Primary | ICD-10-CM

## 2021-02-17 PROCEDURE — 99213 OFFICE O/P EST LOW 20 MIN: CPT | Performed by: FAMILY MEDICINE

## 2021-02-17 RX ORDER — SERTRALINE HYDROCHLORIDE 25 MG/1
25 TABLET, FILM COATED ORAL DAILY
Qty: 30 TABLET | Refills: 3 | Status: SHIPPED | OUTPATIENT
Start: 2021-02-17 | End: 2021-06-22

## 2021-02-17 ASSESSMENT — PATIENT HEALTH QUESTIONNAIRE - PHQ9
SUM OF ALL RESPONSES TO PHQ QUESTIONS 1-9: 0
SUM OF ALL RESPONSES TO PHQ QUESTIONS 1-9: 0
1. LITTLE INTEREST OR PLEASURE IN DOING THINGS: 0
2. FEELING DOWN, DEPRESSED OR HOPELESS: 0

## 2021-02-17 ASSESSMENT — ENCOUNTER SYMPTOMS
SHORTNESS OF BREATH: 0
COUGH: 0

## 2021-02-17 NOTE — PROGRESS NOTES
Yanely Iyer  1980 02/17/21    Chief Complaint   Patient presents with    Other     Patient here for 8 week follow up regarding her sleeping problem           Patient here for 8 weeks follow-up to discuss the blood work and to follow-up regarding her insomnia, started her on a trazodone, patient is stats she could not tolerate that and it did not help her sleeping so she stopped taking the trazodone, she did not do the blood work yet. Patient is status also her anxiety and depression not under control and the Wellbutrin does not help her a lot.       Past Medical History:   Diagnosis Date    Anxiety and depression 2/17/2021    Diabetes mellitus ([facility] [address]     Headache     Hyperlipidemia      Past Surgical History:   Procedure Laterality Date    COLONOSCOPY  11/06/2019    polyp, repeat in 10 years    COLONOSCOPY N/A 11/6/2019    COLONOSCOPY POLYPECTOMY SNARE/COLD BIOPSY performed by Patricia Shetty MD at [place] [address], DIAGNOSTIC  11/06/2019    biopsy, f/u with aman in 2 weeks    UPPER GASTROINTESTINAL ENDOSCOPY N/A 11/6/2019    EGD BIOPSY performed by Patricia Shetty MD at [place] [address]     Family History   Problem Relation Age of Onset    Colon Cancer Neg Hx     Stomach Cancer Neg Hx      Social History     Socioeconomic History    Marital status: Single     Spouse name: Not on file    Number of children: Not on file    Years of education: Not on file    Highest education level: Not on file   Occupational History    Not on file   Social Needs    Financial resource strain: Not on file    Food insecurity     Worry: Not on file     Inability: Not on file    Transportation needs     Medical: Not on file     Non-medical: Not on file   Tobacco Use    Smoking status: Never Smoker    Smokeless tobacco: Never Used   Substance and Sexual Activity    Alcohol use: Yes     Frequency: 4 or more times a week     Drinks per session: Patient refused     Binge frequency: Daily or almost daily    Drug use: No     Comment: occasionally    Sexual activity: Yes     Partners: Male   Lifestyle    Physical activity     Days per week: Not on file     Minutes per session: Not on file    Stress: Not on file   Relationships    Social connections     Talks on phone: Not on file     Gets together: Not on file     Attends Anglican service: Not on file     Active member of club or organization: Not on file     Attends meetings of clubs or organizations: Not on file     Relationship status: Not on file    Intimate partner violence     Fear of current or ex partner: Not on file     Emotionally abused: Not on file     Physically abused: Not on file     Forced sexual activity: Not on file   Other Topics Concern    Not on file   Social History Narrative    Not on file       No Known Allergies  Current Outpatient Medications   Medication Sig Dispense Refill    sertraline (ZOLOFT) 25 MG tablet Take 1 tablet by mouth daily 30 tablet 3    omeprazole (PRILOSEC) 20 MG delayed release capsule Take 1 capsule by mouth Daily 30 capsule 3    atorvastatin (LIPITOR) 20 MG tablet TAKE 1 TABLET BY MOUTH EVERY DAY 30 tablet 5    buPROPion (WELLBUTRIN SR) 150 MG extended release tablet TAKE 1 TABLET BY MOUTH EVERY 12 HOURS LAST DOSE BEFORE 6 PM 60 tablet 5    Multiple Vitamin (DAILY-NADER) TABS TAKE 1 TABLET BY MOUTH EVERY DAY 30 tablet 5    folic acid (FOLVITE) 194 MCG tablet TAKE 1 TABLET BY MOUTH EVERY DAY 30 tablet 5    vitamin B-1 (THIAMINE) 100 MG tablet Take 1 tablet by mouth daily 30 tablet 5    HUMALOG KWIKPEN 100 UNIT/ML pen       JANUVIA 100 MG tablet 100 mg every morning       blood glucose monitor strips Check blood glucose AC and  strip 3    Lancets MISC Check blood glucose AC and  each 3    Insulin Pen Needle 31G X 5 MM MISC 1 each by Does not apply route daily 100 each 3    acetaminophen (TYLENOL) 500 MG tablet Take 1,000 mg by mouth every 6 hours as needed.       Blood Glucose Monitoring Suppl (TRUE METRIX METER) w/Device KIT 1 KIT BY DOES NOT APPLY ROUTE DAILY  0    metFORMIN (GLUCOPHAGE) 500 MG tablet Take 1 tablet by mouth 2 times daily (with meals) (Patient taking differently: Take 500 mg by mouth 5 times daily Indications: pt reports taking 2 tabs in a.m., 2 tabs in p.m. ) 60 tablet 0     No current facility-administered medications for this visit. Review of Systems   Constitutional: Negative for activity change, chills and fever. Respiratory: Negative for cough and shortness of breath. Cardiovascular: Negative for chest pain and leg swelling. Psychiatric/Behavioral: Positive for agitation and sleep disturbance. Negative for self-injury and suicidal ideas. The patient is nervous/anxious.         Lab Results   Component Value Date    WBC 9.8 08/07/2019    HGB 12.8 08/07/2019    HCT 39.9 08/07/2019    MCV 92.8 08/07/2019     08/07/2019     Lab Results   Component Value Date     (L) 03/18/2019    K 4.7 03/18/2019    CL 98 (L) 03/18/2019    CO2 22 03/18/2019    BUN 8 03/18/2019    CREATININE 0.6 03/18/2019    GLUCOSE 221 (H) 03/18/2019    CALCIUM 9.4 03/18/2019    PROT 7.4 08/07/2019    LABALBU 4.4 08/07/2019    BILITOT 0.2 08/07/2019    ALKPHOS 92 08/07/2019    AST 79 (H) 08/07/2019     (H) 08/07/2019    LABGLOM >60 03/18/2019    GFRAA >60 03/18/2019     Lab Results   Component Value Date    CHOL 198 03/18/2019    CHOL 274 (H) 10/01/2018     Lab Results   Component Value Date    TRIG 91 03/18/2019    TRIG 142 10/01/2018     Lab Results   Component Value Date    HDL 55 03/18/2019    HDL 52 10/01/2018     No results found for: LDLCALC, LDLCHOLESTEROL  Lab Results   Component Value Date    LABA1C 10.9 05/19/2020     Lab Results   Component Value Date    TSHHS 2.100 10/01/2018         /78 (Site: Left Upper Arm, Position: Sitting, Cuff Size: Medium Adult)   Pulse 91   Wt 198 lb (89.8 kg)   SpO2 98%   BMI 31.96 kg/m²     BP Readings from Last 3 Encounters:   02/17/21 114/78   11/16/20 124/80   09/11/20 120/72       Wt Readings from Last 3 Encounters:   02/17/21 198 lb (89.8 kg)   11/16/20 205 lb 9.6 oz (93.3 kg)   09/11/20 206 lb 3.2 oz (93.5 kg)         Physical Exam  Constitutional:       Appearance: Normal appearance. She is obese. HENT:      Head: Normocephalic and atraumatic. Neck:      Musculoskeletal: Normal range of motion and neck supple. Cardiovascular:      Rate and Rhythm: Normal rate and regular rhythm. Pulses: Normal pulses. Heart sounds: Normal heart sounds. Neurological:      Mental Status: She is alert and oriented to person, place, and time. Psychiatric:         Mood and Affect: Mood normal.         Behavior: Behavior normal.         ASSESSMENT/ PLAN:    1. Primary insomnia  -So we will discontinue the trazodone and we will try to treat her anxiety and depression with Zoloft  - sertraline (ZOLOFT) 25 MG tablet; Take 1 tablet by mouth daily  Dispense: 30 tablet; Refill: 3    2. Anxiety and depression  -start the Zoloft  - sertraline (ZOLOFT) 25 MG tablet; Take 1 tablet by mouth daily  Dispense: 30 tablet; Refill: 3  -Call us if you have any side effects            -Need to do the blood work  - Appropriate prescription are addressed. - After visit romany provided. - Questions answered and patient verbalizes understanding.  - Call for any problem, questions, or concerns. Return in about 6 weeks (around 3/31/2021).

## 2021-04-29 DIAGNOSIS — E78.2 MIXED HYPERLIPIDEMIA: ICD-10-CM

## 2021-04-29 RX ORDER — ATORVASTATIN CALCIUM 20 MG/1
TABLET, FILM COATED ORAL
Qty: 90 TABLET | Refills: 1 | Status: SHIPPED | OUTPATIENT
Start: 2021-04-29 | End: 2021-10-18

## 2021-05-08 DIAGNOSIS — F51.01 PRIMARY INSOMNIA: ICD-10-CM

## 2021-05-08 DIAGNOSIS — F33.1 MODERATE EPISODE OF RECURRENT MAJOR DEPRESSIVE DISORDER (HCC): ICD-10-CM

## 2021-05-11 RX ORDER — BUPROPION HYDROCHLORIDE 150 MG/1
150 TABLET, EXTENDED RELEASE ORAL EVERY 12 HOURS
Qty: 60 TABLET | Refills: 5 | Status: SHIPPED | OUTPATIENT
Start: 2021-05-11 | End: 2021-10-28

## 2021-05-11 RX ORDER — TRAZODONE HYDROCHLORIDE 50 MG/1
TABLET ORAL
Qty: 30 TABLET | Refills: 5 | Status: SHIPPED | OUTPATIENT
Start: 2021-05-11 | End: 2021-06-25

## 2021-05-11 RX ORDER — MULTIVITAMIN WITH FOLIC ACID 400 MCG
TABLET ORAL
Qty: 30 TABLET | Refills: 5 | Status: SHIPPED | OUTPATIENT
Start: 2021-05-11

## 2021-05-11 RX ORDER — LANOLIN ALCOHOL/MO/W.PET/CERES
CREAM (GRAM) TOPICAL
Qty: 30 TABLET | Refills: 5 | Status: SHIPPED | OUTPATIENT
Start: 2021-05-11 | End: 2021-11-05

## 2021-06-22 DIAGNOSIS — F41.9 ANXIETY AND DEPRESSION: ICD-10-CM

## 2021-06-22 DIAGNOSIS — F51.01 PRIMARY INSOMNIA: ICD-10-CM

## 2021-06-22 DIAGNOSIS — F32.A ANXIETY AND DEPRESSION: ICD-10-CM

## 2021-06-22 RX ORDER — SERTRALINE HYDROCHLORIDE 25 MG/1
TABLET, FILM COATED ORAL
Qty: 30 TABLET | Refills: 3 | Status: SHIPPED | OUTPATIENT
Start: 2021-06-22 | End: 2021-06-25

## 2021-06-25 ENCOUNTER — OFFICE VISIT (OUTPATIENT)
Dept: FAMILY MEDICINE CLINIC | Age: 41
End: 2021-06-25
Payer: COMMERCIAL

## 2021-06-25 VITALS
DIASTOLIC BLOOD PRESSURE: 82 MMHG | OXYGEN SATURATION: 97 % | SYSTOLIC BLOOD PRESSURE: 122 MMHG | BODY MASS INDEX: 29.86 KG/M2 | WEIGHT: 185 LBS | HEART RATE: 99 BPM

## 2021-06-25 DIAGNOSIS — M79.641 RIGHT HAND PAIN: ICD-10-CM

## 2021-06-25 DIAGNOSIS — F41.9 ANXIETY AND DEPRESSION: ICD-10-CM

## 2021-06-25 DIAGNOSIS — F51.01 PRIMARY INSOMNIA: ICD-10-CM

## 2021-06-25 DIAGNOSIS — E11.9 TYPE 2 DIABETES MELLITUS WITHOUT COMPLICATION, WITH LONG-TERM CURRENT USE OF INSULIN (HCC): Primary | ICD-10-CM

## 2021-06-25 DIAGNOSIS — F32.A ANXIETY AND DEPRESSION: ICD-10-CM

## 2021-06-25 DIAGNOSIS — Z79.4 TYPE 2 DIABETES MELLITUS WITHOUT COMPLICATION, WITH LONG-TERM CURRENT USE OF INSULIN (HCC): Primary | ICD-10-CM

## 2021-06-25 LAB
CREATININE URINE POCT: NORMAL
HBA1C MFR BLD: 6.8 %
MICROALBUMIN/CREAT 24H UR: NORMAL MG/G{CREAT}
MICROALBUMIN/CREAT UR-RTO: NORMAL

## 2021-06-25 PROCEDURE — 3044F HG A1C LEVEL LT 7.0%: CPT | Performed by: FAMILY MEDICINE

## 2021-06-25 PROCEDURE — 2022F DILAT RTA XM EVC RTNOPTHY: CPT | Performed by: FAMILY MEDICINE

## 2021-06-25 PROCEDURE — G8417 CALC BMI ABV UP PARAM F/U: HCPCS | Performed by: FAMILY MEDICINE

## 2021-06-25 PROCEDURE — 99214 OFFICE O/P EST MOD 30 MIN: CPT | Performed by: FAMILY MEDICINE

## 2021-06-25 PROCEDURE — 82044 UR ALBUMIN SEMIQUANTITATIVE: CPT | Performed by: FAMILY MEDICINE

## 2021-06-25 PROCEDURE — 83036 HEMOGLOBIN GLYCOSYLATED A1C: CPT | Performed by: FAMILY MEDICINE

## 2021-06-25 PROCEDURE — 1036F TOBACCO NON-USER: CPT | Performed by: FAMILY MEDICINE

## 2021-06-25 PROCEDURE — G8427 DOCREV CUR MEDS BY ELIG CLIN: HCPCS | Performed by: FAMILY MEDICINE

## 2021-06-25 RX ORDER — NAPROXEN 500 MG/1
500 TABLET ORAL 2 TIMES DAILY WITH MEALS
Qty: 20 TABLET | Refills: 0 | Status: SHIPPED | OUTPATIENT
Start: 2021-06-25 | End: 2022-01-20

## 2021-06-25 ASSESSMENT — ENCOUNTER SYMPTOMS
ABDOMINAL PAIN: 0
SHORTNESS OF BREATH: 0
COUGH: 0

## 2021-06-25 NOTE — PROGRESS NOTES
Preston Benton  1980 06/25/21    Chief Complaint   Patient presents with    Depression     6 week follow up    Diabetes    Wrist Pain     right wrist pain radiating around thumb, causing weakness in the hand            Patient here for 6 wks f/u regarding her anxiety and depression, we statrted her od zoloft last visit, patient didn;t take it because make her drowsy, she didn;t do the blood work yet, she dose see dr Hellen Welch but will do the A1C  Today, patient also c/o:    Hand Pain   There was no injury mechanism. The pain radiates to the right hand (x 2 months). The pain is at a severity of 8/10. The pain is moderate. The pain has been constant since the incident. Associated symptoms include tingling. Pertinent negatives include no chest pain, muscle weakness or numbness. The symptoms are aggravated by movement. She has tried nothing for the symptoms. The treatment provided mild relief.        Past Medical History:   Diagnosis Date    Anxiety and depression 2/17/2021    Diabetes mellitus ([address]     Headache     Hyperlipidemia      Past Surgical History:   Procedure Laterality Date    COLONOSCOPY  11/06/2019    polyp, repeat in 10 years    COLONOSCOPY N/A 11/6/2019    COLONOSCOPY POLYPECTOMY SNARE/COLD BIOPSY performed by Pedro Pelaez MD at [facility], DIAGNOSTIC  11/06/2019    biopsy, f/u with aman in 2 weeks    UPPER GASTROINTESTINAL ENDOSCOPY N/A 11/6/2019    EGD BIOPSY performed by Pedro Pelaez MD at [facility]     Family History   Problem Relation Age of Onset    Colon Cancer Neg Hx     Stomach Cancer Neg Hx      Social History     Socioeconomic History    Marital status: Single     Spouse name: Not on file    Number of children: Not on file    Years of education: Not on file    Highest education level: Not on file   Occupational History    Not on file   Tobacco Use    Smoking status: Never Smoker    Smokeless tobacco: Never Used   Vaping Use    Vaping Use: Never used   Substance and Sexual Activity    Alcohol use: Yes    Drug use: No     Comment: occasionally    Sexual activity: Yes     Partners: Male   Other Topics Concern    Not on file   Social History Narrative    Not on file     Social Determinants of Health     Financial Resource Strain:     Difficulty of Paying Living Expenses:    Food Insecurity:     Worried About Running Out of Food in the Last Year:     [address] in the Last Year:    Transportation Needs:     Lack of Transportation (Medical):      Lack of Transportation (Non-Medical):    Physical Activity:     Days of Exercise per Week:     Minutes of Exercise per Session:    Stress:     Feeling of Stress :    Social Connections:     Frequency of Communication with Friends and Family:     Frequency of Social Gatherings with Friends and Family:     Attends [employer] Services:     Active Member of Clubs or Organizations:     Attends Club or Organization Meetings:     Marital Status:    Intimate Partner Violence:     Fear of Current or Ex-Partner:     Emotionally Abused:     Physically Abused:     Sexually Abused:        No Known Allergies  Current Outpatient Medications   Medication Sig Dispense Refill    naproxen (NAPROSYN) 500 MG tablet Take 1 tablet by mouth 2 times daily (with meals) for 10 days 20 tablet 0    UNABLE TO FIND R spica thumb splint 1 Device 0    Multiple Vitamin (DAILY-NADER MULTIVITAMIN) TABS TAKE 1 TABLET BY MOUTH EVERY DAY 30 tablet 5    folic acid (FOLVITE) 302 MCG tablet TAKE 1 TABLET BY MOUTH EVERY DAY 30 tablet 5    buPROPion (WELLBUTRIN SR) 150 MG extended release tablet TAKE 1 TABLET BY MOUTH EVERY 12 HOURS LAST DOSE BEFORE 6 PM 60 tablet 5    atorvastatin (LIPITOR) 20 MG tablet TAKE 1 TABLET BY MOUTH EVERY DAY 90 tablet 1    omeprazole (PRILOSEC) 20 MG delayed release capsule Take 1 capsule by mouth Daily 30 capsule 3    vitamin B-1 (THIAMINE) 100 MG tablet Take 1 tablet by mouth daily 30 tablet 5    HUMALOG KWIKPEN 100 UNIT/ML pen       JANUVIA 100 MG tablet 100 mg every morning       Blood Glucose Monitoring Suppl (TRUE METRIX METER) w/Device KIT 1 KIT BY DOES NOT APPLY ROUTE DAILY  0    blood glucose monitor strips Check blood glucose AC and  strip 3    Lancets MISC Check blood glucose AC and  each 3    metFORMIN (GLUCOPHAGE) 500 MG tablet Take 1 tablet by mouth 2 times daily (with meals) (Patient taking differently: Take 500 mg by mouth 5 times daily Indications: pt reports taking 2 tabs in a.m., 2 tabs in p.m. ) 60 tablet 0    Insulin Pen Needle 31G X 5 MM MISC 1 each by Does not apply route daily 100 each 3    acetaminophen (TYLENOL) 500 MG tablet Take 1,000 mg by mouth every 6 hours as needed. No current facility-administered medications for this visit. Review of Systems   Constitutional: Negative for activity change, chills and fever. HENT: Negative for congestion. Respiratory: Negative for cough and shortness of breath. Cardiovascular: Negative for chest pain and leg swelling. Gastrointestinal: Negative for abdominal pain. Musculoskeletal: Positive for arthralgias (R hand). Neurological: Positive for tingling. Negative for dizziness and numbness. Psychiatric/Behavioral: Positive for agitation and sleep disturbance. Negative for self-injury and suicidal ideas. The patient is nervous/anxious.         Lab Results   Component Value Date    WBC 9.8 08/07/2019    HGB 12.8 08/07/2019    HCT 39.9 08/07/2019    MCV 92.8 08/07/2019     08/07/2019     Lab Results   Component Value Date     (L) 03/18/2019    K 4.7 03/18/2019    CL 98 (L) 03/18/2019    CO2 22 03/18/2019    BUN 8 03/18/2019    CREATININE 0.6 03/18/2019    GLUCOSE 221 (H) 03/18/2019    CALCIUM 9.4 03/18/2019    PROT 7.4 08/07/2019    LABALBU 4.4 08/07/2019    BILITOT 0.2 08/07/2019    ALKPHOS 92 08/07/2019    AST 79 (H) 08/07/2019     (H) 08/07/2019    LABGLOM >60 03/18/2019    GFRAA >60 03/18/2019     Lab Results   Component Value Date    CHOL 198 03/18/2019    CHOL 274 (H) 10/01/2018     Lab Results   Component Value Date    TRIG 91 03/18/2019    TRIG 142 10/01/2018     Lab Results   Component Value Date    HDL 55 03/18/2019    HDL 52 10/01/2018     No results found for: LDLCALC, LDLCHOLESTEROL  Lab Results   Component Value Date    LABA1C 6.8 06/25/2021     Lab Results   Component Value Date    TSHHS 2.100 10/01/2018         /82 (Site: Left Upper Arm, Position: Sitting, Cuff Size: Medium Adult)   Pulse 99   Wt 185 lb (83.9 kg)   SpO2 97%   BMI 29.86 kg/m²     BP Readings from Last 3 Encounters:   06/25/21 122/82   02/17/21 114/78   11/16/20 124/80       Wt Readings from Last 3 Encounters:   06/25/21 185 lb (83.9 kg)   02/17/21 198 lb (89.8 kg)   11/16/20 205 lb 9.6 oz (93.3 kg)         Physical Exam  Constitutional:       Appearance: Normal appearance. She is obese. She is not ill-appearing. HENT:      Head: Normocephalic and atraumatic. Eyes:      General: No scleral icterus. Pupils: Pupils are equal, round, and reactive to light. Cardiovascular:      Rate and Rhythm: Normal rate and regular rhythm. Pulses: Normal pulses. Heart sounds: Normal heart sounds. Pulmonary:      Effort: Pulmonary effort is normal. No respiratory distress. Breath sounds: Normal breath sounds. No wheezing or rales. Musculoskeletal:      Right hand: No swelling or tenderness. Normal range of motion. Normal strength. There is no disruption of two-point discrimination. Cervical back: Normal range of motion and neck supple. Neurological:      General: No focal deficit present. Mental Status: She is alert and oriented to person, place, and time. Psychiatric:         Mood and Affect: Mood normal.         Behavior: Behavior normal.         ASSESSMENT/ PLAN:    1.  Anxiety and depression  -We are for her Paxil patient refused any more medication and states she is doing fine    2. Primary insomnia  -Refuse any medication at this time    3. Type 2 diabetes mellitus without complication, with long-term current use of insulin (HCC)  -Much better than last time so continue the same medication  - POCT glycosylated hemoglobin (Hb A1C)    4. Right hand pain  -Give her the naproxen and do the EMG test  - Bea Dubois MD, Physical Medicine, [place]  - naproxen (NAPROSYN) 500 MG tablet; Take 1 tablet by mouth 2 times daily (with meals) for 10 days  Dispense: 20 tablet; Refill: 0  - UNABLE TO FIND; R spica thumb splint  Dispense: 1 Device; Refill: 0              - All old blood work reviewed with the patient  - Appropriate prescription are addressed. - After visit romany provided. - Questions answered and patient verbalizes understanding.  - Call for any problem, questions, or concerns.  - RTC if symptoms worse. Return in about 6 months (around 12/25/2021).

## 2021-07-06 RX ORDER — OMEPRAZOLE 20 MG/1
20 CAPSULE, DELAYED RELEASE ORAL DAILY
Qty: 30 CAPSULE | Refills: 3 | Status: SHIPPED | OUTPATIENT
Start: 2021-07-06 | End: 2021-10-01 | Stop reason: SDUPTHER

## 2021-10-01 RX ORDER — OMEPRAZOLE 20 MG/1
20 CAPSULE, DELAYED RELEASE ORAL DAILY
Qty: 30 CAPSULE | Refills: 3 | Status: SHIPPED | OUTPATIENT
Start: 2021-10-01 | End: 2022-01-06 | Stop reason: SDUPTHER

## 2021-10-06 DIAGNOSIS — F51.01 PRIMARY INSOMNIA: ICD-10-CM

## 2021-10-06 RX ORDER — TRAZODONE HYDROCHLORIDE 50 MG/1
TABLET ORAL
Qty: 90 TABLET | Refills: 1 | Status: SHIPPED | OUTPATIENT
Start: 2021-10-06 | End: 2022-04-11

## 2021-10-18 DIAGNOSIS — F32.A ANXIETY AND DEPRESSION: ICD-10-CM

## 2021-10-18 DIAGNOSIS — E78.2 MIXED HYPERLIPIDEMIA: ICD-10-CM

## 2021-10-18 DIAGNOSIS — F41.9 ANXIETY AND DEPRESSION: ICD-10-CM

## 2021-10-18 DIAGNOSIS — F51.01 PRIMARY INSOMNIA: ICD-10-CM

## 2021-10-18 RX ORDER — SERTRALINE HYDROCHLORIDE 25 MG/1
TABLET, FILM COATED ORAL
Qty: 90 TABLET | Refills: 1 | Status: SHIPPED | OUTPATIENT
Start: 2021-10-18 | End: 2022-04-19

## 2021-10-18 RX ORDER — ATORVASTATIN CALCIUM 20 MG/1
TABLET, FILM COATED ORAL
Qty: 90 TABLET | Refills: 1 | Status: SHIPPED | OUTPATIENT
Start: 2021-10-18

## 2021-10-28 DIAGNOSIS — F33.1 MODERATE EPISODE OF RECURRENT MAJOR DEPRESSIVE DISORDER (HCC): ICD-10-CM

## 2021-10-28 RX ORDER — BUPROPION HYDROCHLORIDE 150 MG/1
150 TABLET, EXTENDED RELEASE ORAL EVERY 12 HOURS
Qty: 180 TABLET | Refills: 1 | Status: SHIPPED | OUTPATIENT
Start: 2021-10-28 | End: 2022-04-28

## 2021-11-05 RX ORDER — LANOLIN ALCOHOL/MO/W.PET/CERES
CREAM (GRAM) TOPICAL
Qty: 30 TABLET | Refills: 5 | Status: SHIPPED | OUTPATIENT
Start: 2021-11-05

## 2022-01-04 ENCOUNTER — TELEPHONE (OUTPATIENT)
Dept: FAMILY MEDICINE CLINIC | Age: 42
End: 2022-01-04

## 2022-01-04 RX ORDER — AMOXICILLIN 500 MG/1
500 CAPSULE ORAL 3 TIMES DAILY
Qty: 30 CAPSULE | Refills: 0 | Status: SHIPPED | OUTPATIENT
Start: 2022-01-04 | End: 2022-01-14

## 2022-01-04 NOTE — TELEPHONE ENCOUNTER
Patient called on 1/3/2022 and asked for an antibiotic dues she has an impacted tooth. Patient spoke with call center and NO message was put in on 1/3/2022. Patient called back asking if an antibiotic be sent to her pharmacy?  Please advise

## 2022-01-06 RX ORDER — OMEPRAZOLE 20 MG/1
20 CAPSULE, DELAYED RELEASE ORAL DAILY
Qty: 30 CAPSULE | Refills: 3 | Status: SHIPPED | OUTPATIENT
Start: 2022-01-06 | End: 2022-08-02 | Stop reason: SDUPTHER

## 2022-01-20 ENCOUNTER — OFFICE VISIT (OUTPATIENT)
Dept: FAMILY MEDICINE CLINIC | Age: 42
End: 2022-01-20
Payer: COMMERCIAL

## 2022-01-20 VITALS
HEIGHT: 66 IN | WEIGHT: 198.2 LBS | BODY MASS INDEX: 31.85 KG/M2 | DIASTOLIC BLOOD PRESSURE: 80 MMHG | OXYGEN SATURATION: 98 % | HEART RATE: 92 BPM | SYSTOLIC BLOOD PRESSURE: 116 MMHG

## 2022-01-20 DIAGNOSIS — Z79.4 TYPE 2 DIABETES MELLITUS WITHOUT COMPLICATION, WITH LONG-TERM CURRENT USE OF INSULIN (HCC): Primary | ICD-10-CM

## 2022-01-20 DIAGNOSIS — F41.9 ANXIETY AND DEPRESSION: ICD-10-CM

## 2022-01-20 DIAGNOSIS — M54.6 ACUTE MIDLINE THORACIC BACK PAIN: ICD-10-CM

## 2022-01-20 DIAGNOSIS — F32.A ANXIETY AND DEPRESSION: ICD-10-CM

## 2022-01-20 DIAGNOSIS — M25.511 ACUTE PAIN OF RIGHT SHOULDER: ICD-10-CM

## 2022-01-20 DIAGNOSIS — E11.9 TYPE 2 DIABETES MELLITUS WITHOUT COMPLICATION, WITH LONG-TERM CURRENT USE OF INSULIN (HCC): Primary | ICD-10-CM

## 2022-01-20 DIAGNOSIS — L65.9 FALLING HAIR: ICD-10-CM

## 2022-01-20 DIAGNOSIS — E78.2 MIXED HYPERLIPIDEMIA: ICD-10-CM

## 2022-01-20 PROCEDURE — 2022F DILAT RTA XM EVC RTNOPTHY: CPT | Performed by: FAMILY MEDICINE

## 2022-01-20 PROCEDURE — 1036F TOBACCO NON-USER: CPT | Performed by: FAMILY MEDICINE

## 2022-01-20 PROCEDURE — G8427 DOCREV CUR MEDS BY ELIG CLIN: HCPCS | Performed by: FAMILY MEDICINE

## 2022-01-20 PROCEDURE — 99214 OFFICE O/P EST MOD 30 MIN: CPT | Performed by: FAMILY MEDICINE

## 2022-01-20 PROCEDURE — 3046F HEMOGLOBIN A1C LEVEL >9.0%: CPT | Performed by: FAMILY MEDICINE

## 2022-01-20 PROCEDURE — G8417 CALC BMI ABV UP PARAM F/U: HCPCS | Performed by: FAMILY MEDICINE

## 2022-01-20 PROCEDURE — G8484 FLU IMMUNIZE NO ADMIN: HCPCS | Performed by: FAMILY MEDICINE

## 2022-01-20 RX ORDER — EXENATIDE 2 MG/.85ML
INJECTION, SUSPENSION, EXTENDED RELEASE SUBCUTANEOUS
COMMUNITY
Start: 2021-12-16 | End: 2022-01-20

## 2022-01-20 RX ORDER — TIZANIDINE 4 MG/1
4 TABLET ORAL 3 TIMES DAILY
Qty: 60 TABLET | Refills: 0 | Status: SHIPPED | OUTPATIENT
Start: 2022-01-20 | End: 2022-02-19

## 2022-01-20 RX ORDER — NAPROXEN 500 MG/1
500 TABLET ORAL 2 TIMES DAILY WITH MEALS
Qty: 20 TABLET | Refills: 0 | Status: SHIPPED | OUTPATIENT
Start: 2022-01-20

## 2022-01-20 ASSESSMENT — ENCOUNTER SYMPTOMS
BACK PAIN: 1
ABDOMINAL PAIN: 0

## 2022-01-20 NOTE — PROGRESS NOTES
Tu Reveal  1980 01/25/22    Chief Complaint   Patient presents with    6 Month Follow-Up    Back Pain     Patient states having constant back pain           Patient here for 6 months f/u regarding diabetes, hyperlipidemia, anxiety, depression, insomnia, and GERD. patient does see Dr. Aysha Ruth for her diabetes. She is taking cholesterol medication with no side effect, GERD under control. Her anxiety and depression under control with the medications. Patient also c/o:    Back Pain  This is a chronic problem. The current episode started more than 1 year ago. The problem occurs constantly. The problem has been gradually worsening since onset. The pain is present in the thoracic spine. The quality of the pain is described as aching. Radiates to: R shoulder. The pain is at a severity of 7/10. The pain is moderate. Pertinent negatives include no abdominal pain, chest pain, dysuria, fever, numbness or tingling. Risk factors include obesity. She has tried NSAIDs for the symptoms. The treatment provided no relief. Shoulder Pain   The pain is present in the right shoulder. This is a new problem. The problem occurs constantly. The quality of the pain is described as aching. The pain is at a severity of 7/10. Associated symptoms include joint locking, a limited range of motion and stiffness. Pertinent negatives include no fever, numbness or tingling. The symptoms are aggravated by activity. She has tried NSAIDS for the symptoms. The treatment provided no relief. Her past medical history is significant for diabetes. There is no history of gout, osteoarthritis or rheumatoid arthritis.        Past Medical History:   Diagnosis Date    Anxiety and depression 2/17/2021    Diabetes mellitus ([address]     Headache     Hyperlipidemia      Past Surgical History:   Procedure Laterality Date    COLONOSCOPY  11/06/2019    polyp, repeat in 10 years    COLONOSCOPY N/A 11/6/2019    COLONOSCOPY POLYPECTOMY SNARE/COLD BIOPSY performed by Ioana Talbot MD at [place] [address], DIAGNOSTIC  11/06/2019    biopsy, f/u with aman in 2 weeks    UPPER GASTROINTESTINAL ENDOSCOPY N/A 11/6/2019    EGD BIOPSY performed by Ioana Talbot MD at [place] [address]     Family History   Problem Relation Age of Onset    Colon Cancer Neg Hx     Stomach Cancer Neg Hx      Social History     Socioeconomic History    Marital status: Single     Spouse name: Not on file    Number of children: Not on file    Years of education: Not on file    Highest education level: Not on file   Occupational History    Not on file   Tobacco Use    Smoking status: Never Smoker    Smokeless tobacco: Never Used   Vaping Use    Vaping Use: Never used   Substance and Sexual Activity    Alcohol use: Yes    Drug use: No     Comment: occasionally    Sexual activity: Yes     Partners: Male   Other Topics Concern    Not on file   Social History Narrative    Not on file     Social Determinants of Health     Financial Resource Strain:     Difficulty of Paying Living Expenses: Not on file   Food Insecurity:     Worried About Running Out of Food in the Last Year: Not on file    Tiaog of Food in the Last Year: Not on file   Transportation Needs:     Lack of Transportation (Medical): Not on file    Lack of Transportation (Non-Medical):  Not on file   Physical Activity:     Days of Exercise per Week: Not on file    Minutes of Exercise per Session: Not on file   Stress:     Feeling of Stress : Not on file   Social Connections:     Frequency of Communication with Friends and Family: Not on file    Frequency of Social Gatherings with Friends and Family: Not on file    Attends Worship Services: Not on file    Active Member of Clubs or Organizations: Not on file    Attends Club or Organization Meetings: Not on file    Marital Status: Not on file   Intimate Partner Violence:     Fear of Current or Ex-Partner: Not on file    Emotionally Abused: Not on file    Physically Abused: Not on file    Sexually Abused: Not on file   Housing Stability:     Unable to Pay for Housing in the Last Year: Not on file    Number of Places Lived in the Last Year: Not on file    Unstable Housing in the Last Year: Not on file       No Known Allergies  Current Outpatient Medications   Medication Sig Dispense Refill    naproxen (NAPROSYN) 500 MG tablet Take 1 tablet by mouth 2 times daily (with meals) 20 tablet 0    tiZANidine (ZANAFLEX) 4 MG tablet Take 1 tablet by mouth 3 times daily 60 tablet 0    omeprazole (PRILOSEC) 20 MG delayed release capsule Take 1 capsule by mouth Daily 30 capsule 3    folic acid (FOLVITE) 903 MCG tablet TAKE 1 TABLET BY MOUTH EVERY DAY 30 tablet 5    buPROPion (WELLBUTRIN SR) 150 MG extended release tablet TAKE 1 TABLET BY MOUTH EVERY 12 HOURS LAST DOSE BEFORE 6  tablet 1    atorvastatin (LIPITOR) 20 MG tablet TAKE 1 TABLET BY MOUTH EVERY DAY 90 tablet 1    sertraline (ZOLOFT) 25 MG tablet TAKE 1 TABLET BY MOUTH EVERY DAY 90 tablet 1    traZODone (DESYREL) 50 MG tablet TAKE 1 TABLET BY MOUTH EVERY DAY AT BEDTIME AS NEEDED FOR SLEEP 90 tablet 1    UNABLE TO FIND R spica thumb splint 1 Device 0    Multiple Vitamin (DAILY-NADER MULTIVITAMIN) TABS TAKE 1 TABLET BY MOUTH EVERY DAY 30 tablet 5    vitamin B-1 (THIAMINE) 100 MG tablet Take 1 tablet by mouth daily 30 tablet 5    HUMALOG KWIKPEN 100 UNIT/ML pen       Blood Glucose Monitoring Suppl (TRUE METRIX METER) w/Device KIT 1 KIT BY DOES NOT APPLY ROUTE DAILY  0    blood glucose monitor strips Check blood glucose AC and  strip 3    Lancets MISC Check blood glucose AC and  each 3    metFORMIN (GLUCOPHAGE) 500 MG tablet Take 1 tablet by mouth 2 times daily (with meals) (Patient taking differently: Take 500 mg by mouth 5 times daily Indications: pt reports taking 2 tabs in a.m., 2 tabs in p.m. ) 60 tablet 0    Insulin Pen Needle 31G X 5 MM MISC 1 each by Does not apply route daily 100 each 3    acetaminophen (TYLENOL) 500 MG tablet Take 1,000 mg by mouth every 6 hours as needed. No current facility-administered medications for this visit. Review of Systems   Constitutional: Negative for activity change, chills and fever. HENT: Negative for congestion. Respiratory: Negative for cough and shortness of breath. Cardiovascular: Negative for chest pain and leg swelling. Gastrointestinal: Negative for abdominal pain. Genitourinary: Negative for dysuria. Musculoskeletal: Positive for arthralgias (R shoulder), back pain and stiffness. Negative for gout. Skin:        C/o hair falling   Neurological: Negative for dizziness, tingling and numbness. Psychiatric/Behavioral: Positive for agitation and sleep disturbance. Negative for self-injury and suicidal ideas. The patient is nervous/anxious.         Lab Results   Component Value Date    WBC 9.8 08/07/2019    HGB 12.8 08/07/2019    HCT 39.9 08/07/2019    MCV 92.8 08/07/2019     08/07/2019     Lab Results   Component Value Date     (L) 03/18/2019    K 4.7 03/18/2019    CL 98 (L) 03/18/2019    CO2 22 03/18/2019    BUN 8 03/18/2019    CREATININE 0.6 03/18/2019    GLUCOSE 221 (H) 03/18/2019    CALCIUM 9.4 03/18/2019    PROT 7.4 08/07/2019    LABALBU 4.4 08/07/2019    BILITOT 0.2 08/07/2019    ALKPHOS 92 08/07/2019    AST 79 (H) 08/07/2019     (H) 08/07/2019    LABGLOM >60 03/18/2019    GFRAA >60 03/18/2019     Lab Results   Component Value Date    CHOL 198 03/18/2019    CHOL 274 (H) 10/01/2018     Lab Results   Component Value Date    TRIG 91 03/18/2019    TRIG 142 10/01/2018     Lab Results   Component Value Date    HDL 55 03/18/2019    HDL 52 10/01/2018     No results found for: LDLCALC, LDLCHOLESTEROL  Lab Results   Component Value Date    LABA1C 6.8 06/25/2021     Lab Results   Component Value Date    TSHHS 2.100 10/01/2018         /80 (Site: Left Upper Arm, Position: Sitting, Cuff Size: Medium Adult)   Pulse 92   Ht 5' 6\" (1.676 m)   Wt 198 lb 3.2 oz (89.9 kg)   SpO2 98%   BMI 31.99 kg/m²     BP Readings from Last 3 Encounters:   01/20/22 116/80   06/25/21 122/82   02/17/21 114/78       Wt Readings from Last 3 Encounters:   01/20/22 198 lb 3.2 oz (89.9 kg)   06/25/21 185 lb (83.9 kg)   02/17/21 198 lb (89.8 kg)         Physical Exam  Constitutional:       Appearance: Normal appearance. She is obese. She is not ill-appearing. HENT:      Head: Normocephalic and atraumatic. Eyes:      General: No scleral icterus. Pupils: Pupils are equal, round, and reactive to light. Cardiovascular:      Rate and Rhythm: Normal rate and regular rhythm. Pulses: Normal pulses. Heart sounds: Normal heart sounds. Pulmonary:      Effort: Pulmonary effort is normal.      Breath sounds: Normal breath sounds. No wheezing. Musculoskeletal:      Right shoulder: Tenderness present. Decreased range of motion. Decreased strength. Normal pulse. Left shoulder: Normal.      Right hand: No swelling or tenderness. Normal range of motion. Normal strength. There is no disruption of two-point discrimination. Cervical back: Normal range of motion and neck supple. Thoracic back: Spasms and tenderness present. No swelling. Back:       Right lower leg: No edema. Left lower leg: No edema. Neurological:      General: No focal deficit present. Mental Status: She is alert and oriented to person, place, and time. Psychiatric:         Mood and Affect: Mood normal.         Behavior: Behavior normal.         ASSESSMENT/ PLAN:    1. Type 2 diabetes mellitus without complication, with long-term current use of insulin (HCC)  - stable, f/u with Dr Chas Fung, under control  - Lipid Panel; Future  - Hemoglobin A1C; Future  - Comprehensive Metabolic Panel, Fasting; Future    2. Mixed hyperlipidemia  - stable  - Lipid Panel; Future    3. Anxiety and depression  - stable    4.  Thoracic pain  - XR THORACIC SPINE (3 VIEWS); Future  - naproxen (NAPROSYN) 500 MG tablet; Take 1 tablet by mouth 2 times daily (with meals)  Dispense: 20 tablet; Refill: 0  - tiZANidine (ZANAFLEX) 4 MG tablet; Take 1 tablet by mouth 3 times daily  Dispense: 60 tablet; Refill: 0    5. Acute pain of right shoulder  - naproxen (NAPROSYN) 500 MG tablet; Take 1 tablet by mouth 2 times daily (with meals)  Dispense: 20 tablet; Refill: 0  - tiZANidine (ZANAFLEX) 4 MG tablet; Take 1 tablet by mouth 3 times daily  Dispense: 60 tablet; Refill: 0  - XR SHOULDER RIGHT (MIN 2 VIEWS); Future    6. Falling hair  - CBC Auto Differential; Future  - T4, Free; Future  - TSH without Reflex; Future              - All old blood work reviewed with the patient  - Appropriate prescription are addressed. - After visit summery provided. - Questions answered and patient verbalizes understanding.  - Call for any problem, questions, or concerns.  - RTC if symptoms worse. Return in about 6 months (around 7/20/2022).

## 2022-01-25 PROBLEM — M54.6 ACUTE MIDLINE THORACIC BACK PAIN: Status: ACTIVE | Noted: 2022-01-25

## 2022-01-25 PROBLEM — R07.0 THROAT PAIN: Status: ACTIVE | Noted: 2022-01-25

## 2022-01-25 PROBLEM — M25.511 ACUTE PAIN OF RIGHT SHOULDER: Status: ACTIVE | Noted: 2022-01-25

## 2022-01-25 PROBLEM — L65.9 FALLING HAIR: Status: ACTIVE | Noted: 2022-01-25

## 2022-01-25 ASSESSMENT — ENCOUNTER SYMPTOMS
COUGH: 0
SHORTNESS OF BREATH: 0

## 2022-04-11 DIAGNOSIS — F51.01 PRIMARY INSOMNIA: ICD-10-CM

## 2022-04-11 RX ORDER — TRAZODONE HYDROCHLORIDE 50 MG/1
TABLET ORAL
Qty: 90 TABLET | Refills: 1 | Status: SHIPPED | OUTPATIENT
Start: 2022-04-11 | End: 2022-07-25

## 2022-04-19 DIAGNOSIS — F32.A ANXIETY AND DEPRESSION: ICD-10-CM

## 2022-04-19 DIAGNOSIS — F51.01 PRIMARY INSOMNIA: ICD-10-CM

## 2022-04-19 DIAGNOSIS — F41.9 ANXIETY AND DEPRESSION: ICD-10-CM

## 2022-04-19 RX ORDER — SERTRALINE HYDROCHLORIDE 25 MG/1
TABLET, FILM COATED ORAL
Qty: 90 TABLET | Refills: 1 | Status: SHIPPED | OUTPATIENT
Start: 2022-04-19 | End: 2022-07-25

## 2022-04-22 ENCOUNTER — OFFICE VISIT (OUTPATIENT)
Dept: FAMILY MEDICINE CLINIC | Age: 42
End: 2022-04-22
Payer: COMMERCIAL

## 2022-04-22 VITALS
SYSTOLIC BLOOD PRESSURE: 106 MMHG | DIASTOLIC BLOOD PRESSURE: 72 MMHG | BODY MASS INDEX: 32.38 KG/M2 | WEIGHT: 200.6 LBS | TEMPERATURE: 98.6 F | HEART RATE: 98 BPM | OXYGEN SATURATION: 99 %

## 2022-04-22 DIAGNOSIS — R35.0 URINARY FREQUENCY: Primary | ICD-10-CM

## 2022-04-22 DIAGNOSIS — R31.0 GROSS HEMATURIA: ICD-10-CM

## 2022-04-22 DIAGNOSIS — R39.15 URINARY URGENCY: ICD-10-CM

## 2022-04-22 DIAGNOSIS — Z32.00 POSSIBLE PREGNANCY: ICD-10-CM

## 2022-04-22 LAB
BILIRUBIN, POC: ABNORMAL
BLOOD URINE, POC: ABNORMAL
CLARITY, POC: ABNORMAL
COLOR, POC: ABNORMAL
CONTROL: NORMAL
GLUCOSE URINE, POC: ABNORMAL
KETONES, POC: ABNORMAL
LEUKOCYTE EST, POC: ABNORMAL
NITRITE, POC: ABNORMAL
PH, POC: 5.5
PREGNANCY TEST URINE, POC: NORMAL
PROTEIN, POC: ABNORMAL
SPECIFIC GRAVITY, POC: >=1.03
UROBILINOGEN, POC: ABNORMAL

## 2022-04-22 PROCEDURE — 81025 URINE PREGNANCY TEST: CPT | Performed by: PHYSICIAN ASSISTANT

## 2022-04-22 PROCEDURE — 81002 URINALYSIS NONAUTO W/O SCOPE: CPT | Performed by: PHYSICIAN ASSISTANT

## 2022-04-22 PROCEDURE — G8427 DOCREV CUR MEDS BY ELIG CLIN: HCPCS | Performed by: PHYSICIAN ASSISTANT

## 2022-04-22 PROCEDURE — 1036F TOBACCO NON-USER: CPT | Performed by: PHYSICIAN ASSISTANT

## 2022-04-22 PROCEDURE — 99213 OFFICE O/P EST LOW 20 MIN: CPT | Performed by: PHYSICIAN ASSISTANT

## 2022-04-22 PROCEDURE — G8417 CALC BMI ABV UP PARAM F/U: HCPCS | Performed by: PHYSICIAN ASSISTANT

## 2022-04-22 RX ORDER — SULFAMETHOXAZOLE AND TRIMETHOPRIM 800; 160 MG/1; MG/1
1 TABLET ORAL 2 TIMES DAILY
Qty: 14 TABLET | Refills: 0 | Status: SHIPPED | OUTPATIENT
Start: 2022-04-22 | End: 2022-04-29

## 2022-04-22 NOTE — PATIENT INSTRUCTIONS
Patient Education        Urinary Tract Infection (UTI) in Women: Care Instructions  Overview     A urinary tract infection, or UTI, is a general term for an infection anywhere between the kidneys and the urethra (where urine comes out). Most UTIs arebladder infections. They often cause pain or burning when you urinate. UTIs are caused by bacteria and can be cured with antibiotics. Be sure tocomplete your treatment so that the infection does not get worse. Follow-up care is a key part of your treatment and safety. Be sure to make and go to all appointments, and call your doctor if you are having problems. It's also a good idea to know your test results and keep alist of the medicines you take. How can you care for yourself at home?  Take your antibiotics as directed. Do not stop taking them just because you feel better. You need to take the full course of antibiotics.  Drink extra water and other fluids for the next day or two. This will help make the urine less concentrated and help wash out the bacteria that are causing the infection. (If you have kidney, heart, or liver disease and have to limit fluids, talk with your doctor before you increase the amount of fluids you drink.)   Avoid drinks that are carbonated or have caffeine. They can irritate the bladder.  Urinate often. Try to empty your bladder each time.  To relieve pain, take a hot bath or lay a heating pad set on low over your lower belly or genital area. Never go to sleep with a heating pad in place. To prevent UTIs   Drink plenty of water each day. This helps you urinate often, which clears bacteria from your system. (If you have kidney, heart, or liver disease and have to limit fluids, talk with your doctor before you increase the amount of fluids you drink.)   Urinate when you need to.  If you are sexually active, urinate right after you have sex.  Change sanitary pads often.    Avoid douches, bubble baths, feminine hygiene sprays, and other feminine hygiene products that have deodorants.  After going to the bathroom, wipe from front to back. When should you call for help? Call your doctor now or seek immediate medical care if:     Symptoms such as fever, chills, nausea, or vomiting get worse or appear for the first time.      You have new pain in your back just below your rib cage. This is called flank pain.      There is new blood or pus in your urine.      You have any problems with your antibiotic medicine. Watch closely for changes in your health, and be sure to contact your doctor if:     You are not getting better after taking an antibiotic for 2 days.      Your symptoms go away but then come back. Where can you learn more? Go to [email or web address] and sign in to your [employer] account. Enter A130 in the [employer] box to learn more about \"Urinary Tract Infection (UTI) in Women: Care Instructions. \"     If you do not have an account, please click on the \"Sign Up Now\" link. Current as of: October 18, 2021               Content Version: 13.2  © 7526-0706 [employer]. Care instructions adapted under license by [facility] ([facility]). If you have questions about a medical condition or this instruction, always ask your healthcare professional. [address] any warranty or liability for your use of this information.

## 2022-04-22 NOTE — PROGRESS NOTES
4/22/2022    Aubrey Jett    Chief Complaint   Patient presents with    Hematuria    Urinary Frequency     with urgency but little output       HPI  History was obtained from patient. Yanely Morales is a 37 y.o. female who presents today with complaints or urinary frequency, urgency, and hematuria x 2 days. She states she has the urge to urinate but with little void. She denies vaginal itching or irritation, vaginal discharge or bleeding. She denies flank pain, nausea, vomiting, or fevers. Has felt chills. States she has had a \"head cold\" for several days. Runny nose, post nasal drip. Janes chest pain, dyspnea, wheezing. Patient would like a pregnancy test.      PAST MEDICAL HISTORY  Past Medical History:   Diagnosis Date    Anxiety and depression 2/17/2021    Diabetes mellitus ([facility] [address]     Headache     Hyperlipidemia        FAMILY HISTORY  Family History   Problem Relation Age of Onset    Colon Cancer Neg Hx     Stomach Cancer Neg Hx        SOCIAL HISTORY  Social History     Socioeconomic History    Marital status: Single     Spouse name: None    Number of children: None    Years of education: None    Highest education level: None   Occupational History    None   Tobacco Use    Smoking status: Never Smoker    Smokeless tobacco: Never Used   Vaping Use    Vaping Use: Never used   Substance and Sexual Activity    Alcohol use: Yes    Drug use: No     Comment: occasionally    Sexual activity: Yes     Partners: Male   Other Topics Concern    None   Social History Narrative    None     Social Determinants of Health     Financial Resource Strain:     Difficulty of Paying Living Expenses: Not on file   Food Insecurity:     Worried About Running Out of Food in the Last Year: Not on file    [place] of Food in the Last Year: Not on file   Transportation Needs:     Lack of Transportation (Medical): Not on file    Lack of Transportation (Non-Medical):  Not on file   Physical Activity:     Days of Exercise per Week: Not on file    Minutes of Exercise per Session: Not on file   Stress:     Feeling of Stress : Not on file   Social Connections:     Frequency of Communication with Friends and Family: Not on file    Frequency of Social Gatherings with Friends and Family: Not on file    Attends Worship Services: Not on file    Active Member of [address] [place] [address] or Organizations: Not on file    Attends Club or Organization Meetings: Not on file    Marital Status: Not on file   Intimate Partner Violence:     Fear of Current or Ex-Partner: Not on file    Emotionally Abused: Not on file    Physically Abused: Not on file    Sexually Abused: Not on file   Housing Stability:     Unable to Pay for Housing in the Last Year: Not on file    Number of [place] in the Last Year: Not on file    Unstable Housing in the Last Year: Not on file        SURGICAL HISTORY  Past Surgical History:   Procedure Laterality Date    COLONOSCOPY  11/06/2019    polyp, repeat in 10 years    COLONOSCOPY N/A 11/6/2019    COLONOSCOPY POLYPECTOMY SNARE/COLD BIOPSY performed by Panda Mckee MD at [place] [address], DIAGNOSTIC  11/06/2019    biopsy, f/u with aman in 2 weeks    UPPER GASTROINTESTINAL ENDOSCOPY N/A 11/6/2019    EGD BIOPSY performed by Panda Mckee MD at [address]  Current Outpatient Medications   Medication Sig Dispense Refill    sulfamethoxazole-trimethoprim (BACTRIM DS) 800-160 MG per tablet Take 1 tablet by mouth 2 times daily for 7 days 14 tablet 0    sertraline (ZOLOFT) 25 MG tablet TAKE 1 TABLET BY MOUTH EVERY DAY 90 tablet 1    traZODone (DESYREL) 50 MG tablet TAKE 1 TABLET BY MOUTH EVERY DAY AT BEDTIME AS NEEDED FOR SLEEP 90 tablet 1    naproxen (NAPROSYN) 500 MG tablet Take 1 tablet by mouth 2 times daily (with meals) 20 tablet 0    omeprazole (PRILOSEC) 20 MG delayed release capsule Take 1 capsule by mouth Daily 30 capsule 3    folic acid (FOLVITE) 400 MCG tablet TAKE 1 TABLET BY MOUTH EVERY DAY 30 tablet 5    buPROPion (WELLBUTRIN SR) 150 MG extended release tablet TAKE 1 TABLET BY MOUTH EVERY 12 HOURS LAST DOSE BEFORE 6  tablet 1    atorvastatin (LIPITOR) 20 MG tablet TAKE 1 TABLET BY MOUTH EVERY DAY 90 tablet 1    UNABLE TO FIND [employer] thumb splint 1 Device 0    Multiple Vitamin (DAILY-NADER MULTIVITAMIN) TABS TAKE 1 TABLET BY MOUTH EVERY DAY 30 tablet 5    vitamin B-1 (THIAMINE) 100 MG tablet Take 1 tablet by mouth daily 30 tablet 5    HUMALOG KWIKPEN 100 UNIT/ML pen       Blood Glucose Monitoring Suppl (TRUE METRIX METER) w/Device KIT 1 KIT BY DOES NOT APPLY ROUTE DAILY  0    blood glucose monitor strips Check blood glucose AC and  strip 3    Lancets MISC Check blood glucose AC and  each 3    metFORMIN (GLUCOPHAGE) 500 MG tablet Take 1 tablet by mouth 2 times daily (with meals) (Patient taking differently: Take 500 mg by mouth 5 times daily Indications: pt reports taking 2 tabs in a.m., 2 tabs in p.m. ) 60 tablet 0    Insulin Pen Needle 31G X 5 MM MISC 1 each by Does not apply route daily 100 each 3    acetaminophen (TYLENOL) 500 MG tablet Take 1,000 mg by mouth every 6 hours as needed. No current facility-administered medications for this visit. ALLERGIES  No Known Allergies    PHYSICAL EXAM    /72   Pulse 98   Temp 98.6 °F (37 °C) (Oral)   Wt 200 lb 9.6 oz (91 kg)   SpO2 99%   Breastfeeding No   BMI 32.38 kg/m²     Constitutional:  Well developed, well nourished  HENT:  Normocephalic, atraumatic. Oropharynx moist, postnasal drip noted. No obvious nasal congestion. Eyes:  conjunctiva normal, no discharge, no scleral icterus  Neck:  No tenderness, supple  Lymphatic:  No lymphadenopathy noted  Cardiovascular:  Normal heart rate, normal rhythm, no murmurs, gallops or rubs  Thorax & Lungs:  Normal breath sounds, no respiratory distress, no wheezing, no rales, no rhonchi  Abdomen: Obese.   Abdomen is soft with mild suprapubic tenderness to palpation. No palpable mass organomegaly. No distention. Skin:  Warm, dry, no erythema, no rash  Back:  No CVA tenderness  Neurologic:  Alert & oriented   Psychiatric:  Affect normal, mood normal    ASSESSMENT & PLAN    Ramila Sarmiento was seen today for hematuria and urinary frequency. Diagnoses and all orders for this visit:    Urinary frequency  -     POCT Urinalysis no Micro  -     Culture, Urine  -     sulfamethoxazole-trimethoprim (BACTRIM DS) 800-160 MG per tablet; Take 1 tablet by mouth 2 times daily for 7 days    Urinary urgency  -     POCT Urinalysis no Micro  -     Culture, Urine  -     sulfamethoxazole-trimethoprim (BACTRIM DS) 800-160 MG per tablet; Take 1 tablet by mouth 2 times daily for 7 days    Gross hematuria  -     POCT Urinalysis no Micro  -     Culture, Urine  -     sulfamethoxazole-trimethoprim (BACTRIM DS) 800-160 MG per tablet; Take 1 tablet by mouth 2 times daily for 7 days    Possible pregnancy  -     POCT urine pregnancy       POC urine pregnancy negative  Urinalysis positive for large blood and leukocytes  Urine culture pending  We will treat for presumed urinary tract infection with Bactrim DS 1 tablet by mouth twice daily for 7 days. UTI prophylaxis discussed, patient admits she does not drink much water throughout the day and holds her urine for long periods of time. Advised patient to follow-up with PCP if symptoms persist, ER for any sudden worsening such as worsening pain, persistent hematuria, onset of flank pain, nausea, vomiting, racing heart, or fevers    There are no discontinued medications. No follow-ups on file. Patient verbalizes understanding with the above plan and is in agreement. Patient will call with  questions or concerns. Please note that this chart was generated using dragon dictation software.   Although every effort was made to ensure the accuracy of this automated transcription, some errors in

## 2022-04-24 LAB
ORGANISM: ABNORMAL
URINE CULTURE, ROUTINE: ABNORMAL

## 2022-04-27 DIAGNOSIS — F33.1 MODERATE EPISODE OF RECURRENT MAJOR DEPRESSIVE DISORDER (HCC): ICD-10-CM

## 2022-04-28 RX ORDER — BUPROPION HYDROCHLORIDE 150 MG/1
150 TABLET, EXTENDED RELEASE ORAL EVERY 12 HOURS
Qty: 180 TABLET | Refills: 1 | Status: SHIPPED | OUTPATIENT
Start: 2022-04-28 | End: 2022-10-27

## 2022-07-25 DIAGNOSIS — F41.9 ANXIETY AND DEPRESSION: ICD-10-CM

## 2022-07-25 DIAGNOSIS — F51.01 PRIMARY INSOMNIA: ICD-10-CM

## 2022-07-25 DIAGNOSIS — F32.A ANXIETY AND DEPRESSION: ICD-10-CM

## 2022-07-25 RX ORDER — SERTRALINE HYDROCHLORIDE 25 MG/1
TABLET, FILM COATED ORAL
Qty: 90 TABLET | Refills: 1 | Status: SHIPPED | OUTPATIENT
Start: 2022-07-25 | End: 2022-07-28

## 2022-07-25 RX ORDER — TRAZODONE HYDROCHLORIDE 50 MG/1
TABLET ORAL
Qty: 90 TABLET | Refills: 1 | Status: SHIPPED | OUTPATIENT
Start: 2022-07-25

## 2022-07-28 ENCOUNTER — OFFICE VISIT (OUTPATIENT)
Dept: FAMILY MEDICINE CLINIC | Age: 42
End: 2022-07-28
Payer: COMMERCIAL

## 2022-07-28 VITALS
RESPIRATION RATE: 16 BRPM | WEIGHT: 200 LBS | BODY MASS INDEX: 32.14 KG/M2 | DIASTOLIC BLOOD PRESSURE: 70 MMHG | OXYGEN SATURATION: 95 % | HEART RATE: 95 BPM | SYSTOLIC BLOOD PRESSURE: 122 MMHG | HEIGHT: 66 IN

## 2022-07-28 DIAGNOSIS — E11.9 TYPE 2 DIABETES MELLITUS WITHOUT COMPLICATION, WITH LONG-TERM CURRENT USE OF INSULIN (HCC): Primary | ICD-10-CM

## 2022-07-28 DIAGNOSIS — F51.01 PRIMARY INSOMNIA: ICD-10-CM

## 2022-07-28 DIAGNOSIS — E78.2 MIXED HYPERLIPIDEMIA: ICD-10-CM

## 2022-07-28 DIAGNOSIS — F41.9 ANXIETY AND DEPRESSION: ICD-10-CM

## 2022-07-28 DIAGNOSIS — F32.A ANXIETY AND DEPRESSION: ICD-10-CM

## 2022-07-28 DIAGNOSIS — Z79.4 TYPE 2 DIABETES MELLITUS WITHOUT COMPLICATION, WITH LONG-TERM CURRENT USE OF INSULIN (HCC): Primary | ICD-10-CM

## 2022-07-28 DIAGNOSIS — J01.90 ACUTE NON-RECURRENT SINUSITIS, UNSPECIFIED LOCATION: ICD-10-CM

## 2022-07-28 LAB — HBA1C MFR BLD: 14 %

## 2022-07-28 PROCEDURE — G8427 DOCREV CUR MEDS BY ELIG CLIN: HCPCS | Performed by: FAMILY MEDICINE

## 2022-07-28 PROCEDURE — 3046F HEMOGLOBIN A1C LEVEL >9.0%: CPT | Performed by: FAMILY MEDICINE

## 2022-07-28 PROCEDURE — 2022F DILAT RTA XM EVC RTNOPTHY: CPT | Performed by: FAMILY MEDICINE

## 2022-07-28 PROCEDURE — G8417 CALC BMI ABV UP PARAM F/U: HCPCS | Performed by: FAMILY MEDICINE

## 2022-07-28 PROCEDURE — 1036F TOBACCO NON-USER: CPT | Performed by: FAMILY MEDICINE

## 2022-07-28 PROCEDURE — 83036 HEMOGLOBIN GLYCOSYLATED A1C: CPT | Performed by: FAMILY MEDICINE

## 2022-07-28 PROCEDURE — 99214 OFFICE O/P EST MOD 30 MIN: CPT | Performed by: FAMILY MEDICINE

## 2022-07-28 RX ORDER — DOXYCYCLINE HYCLATE 100 MG
100 TABLET ORAL 2 TIMES DAILY
Qty: 14 TABLET | Refills: 0 | Status: SHIPPED | OUTPATIENT
Start: 2022-07-28 | End: 2022-08-04

## 2022-07-28 SDOH — ECONOMIC STABILITY: FOOD INSECURITY: WITHIN THE PAST 12 MONTHS, YOU WORRIED THAT YOUR FOOD WOULD RUN OUT BEFORE YOU GOT MONEY TO BUY MORE.: PATIENT DECLINED

## 2022-07-28 SDOH — ECONOMIC STABILITY: FOOD INSECURITY: WITHIN THE PAST 12 MONTHS, THE FOOD YOU BOUGHT JUST DIDN'T LAST AND YOU DIDN'T HAVE MONEY TO GET MORE.: PATIENT DECLINED

## 2022-07-28 ASSESSMENT — SOCIAL DETERMINANTS OF HEALTH (SDOH): HOW HARD IS IT FOR YOU TO PAY FOR THE VERY BASICS LIKE FOOD, HOUSING, MEDICAL CARE, AND HEATING?: PATIENT DECLINED

## 2022-07-28 ASSESSMENT — PATIENT HEALTH QUESTIONNAIRE - PHQ9: DEPRESSION UNABLE TO ASSESS: PT REFUSES

## 2022-07-28 NOTE — PROGRESS NOTES
[facility]  1980 07/30/22    Chief Complaint   Patient presents with    6 Month Follow-Up    Diabetes    Hyperlipidemia    Anxiety    Depression    Insomnia    Gastroesophageal Reflux           Patient here for 6 months f/u regarding diabetes, hyperlipidemia, anxiety, depression, insomnia, and GERD. patient does see Dr. Luis Daniel Moura for her diabetes. She is taking cholesterol medication with no side effect, GERD under control. Her anxiety and depression under control with the medications. Patient also c/o:    Sinus Problem  This is a new problem. The current episode started in the past 7 days. The problem has been gradually worsening since onset. There has been no fever. Her pain is at a severity of 5/10. Associated symptoms include congestion, coughing, headaches and sinus pressure. Pertinent negatives include no chills, ear pain, neck pain, shortness of breath or sore throat. Past treatments include acetaminophen. The treatment provided no relief.      Past Medical History:   Diagnosis Date    Anxiety and depression 2/17/2021    Diabetes mellitus ([address]     Headache     Hyperlipidemia      Past Surgical History:   Procedure Laterality Date    COLONOSCOPY  11/06/2019    polyp, repeat in 10 years    COLONOSCOPY N/A 11/6/2019    COLONOSCOPY POLYPECTOMY SNARE/COLD BIOPSY performed by Colleen Andrews MD at [address] [place] [address], COLON, DIAGNOSTIC  11/06/2019    biopsy, f/u with aman in 2 weeks    UPPER GASTROINTESTINAL ENDOSCOPY N/A 11/6/2019    EGD BIOPSY performed by Colleen Andrews MD at [place] [address]     Family History   Problem Relation Age of Onset    Colon Cancer Neg Hx     Stomach Cancer Neg Hx      Social History     Socioeconomic History    Marital status: Single     Spouse name: Not on file    Number of children: Not on file    Years of education: Not on file    Highest education level: Not on file   Occupational History    Not on file   Tobacco Use    Smoking status: Never    Smokeless tobacco: Never   Vaping Use    Vaping Use: Never used   Substance and Sexual Activity    Alcohol use: Yes    Drug use: No     Comment: occasionally    Sexual activity: Yes     Partners: Male   Other Topics Concern    Not on file   Social History Narrative    Not on file     Social Determinants of Health     Financial Resource Strain: Unknown    Difficulty of Paying Living Expenses: Patient refused   Food Insecurity: Unknown    Worried About Running Out of Food in the Last Year: Patient refused    Ran Out of Food in the Last Year: Patient refused   Transportation Needs: Not on file   Physical Activity: Not on file   Stress: Not on file   Social Connections: Not on file   Intimate Partner Violence: Not on file   Housing Stability: Not on file       No Known Allergies  Current Outpatient Medications   Medication Sig Dispense Refill    diphenhydrAMINE HCl (BENADRYL ALLERGY PO) Take by mouth      doxycycline hyclate (VIBRA-TABS) 100 MG tablet Take 1 tablet by mouth in the morning and 1 tablet before bedtime. Do all this for 7 days.  14 tablet 0    traZODone (DESYREL) 50 MG tablet TAKE 1 TABLET BY MOUTH AT BEDTIME AS NEEDED FOR SLEEP 90 tablet 1    buPROPion (WELLBUTRIN SR) 150 MG extended release tablet TAKE 1 TABLET BY MOUTH EVERY 12 HOURS LAST DOSE BEFORE 6  tablet 1    naproxen (NAPROSYN) 500 MG tablet Take 1 tablet by mouth 2 times daily (with meals) 20 tablet 0    omeprazole (PRILOSEC) 20 MG delayed release capsule Take 1 capsule by mouth Daily 30 capsule 3    folic acid (FOLVITE) 190 MCG tablet TAKE 1 TABLET BY MOUTH EVERY DAY 30 tablet 5    atorvastatin (LIPITOR) 20 MG tablet TAKE 1 TABLET BY MOUTH EVERY DAY 90 tablet 1    Multiple Vitamin (DAILY-NADER MULTIVITAMIN) TABS TAKE 1 TABLET BY MOUTH EVERY DAY 30 tablet 5    vitamin B-1 (THIAMINE) 100 MG tablet Take 1 tablet by mouth daily 30 tablet 5    HUMALOG KWIKPEN 100 UNIT/ML pen       Blood Glucose Monitoring Suppl (TRUE METRIX METER) w/Device KIT 1 KIT BY DOES NOT APPLY ROUTE DAILY  0    blood glucose monitor strips Check blood glucose AC and  strip 3    Lancets MISC Check blood glucose AC and  each 3    metFORMIN (GLUCOPHAGE) 500 MG tablet Take 1 tablet by mouth 2 times daily (with meals) (Patient taking differently: Take 500 mg by mouth 5 times daily Indications: pt reports taking 2 tabs in a.m., 2 tabs in p.m.) 60 tablet 0    Insulin Pen Needle 31G X 5 MM MISC 1 each by Does not apply route daily 100 each 3    acetaminophen (TYLENOL) 500 MG tablet Take 1,000 mg by mouth every 6 hours as needed. Exenatide (BYDUREON SC) Inject into the skin      UNABLE TO FIND R spica thumb splint (Patient not taking: Reported on 7/28/2022) 1 Device 0     No current facility-administered medications for this visit. Review of Systems   Constitutional:  Negative for activity change, chills, fatigue and fever. HENT:  Positive for congestion and sinus pressure. Negative for ear pain, sore throat and trouble swallowing. Respiratory:  Positive for cough. Negative for shortness of breath. Cardiovascular:  Negative for chest pain and leg swelling. Genitourinary:  Negative for dysuria and hematuria. Musculoskeletal:  Negative for neck pain. Neurological:  Positive for headaches. Negative for dizziness and numbness. Psychiatric/Behavioral:  Negative for agitation and behavioral problems. The patient is not nervous/anxious.       Lab Results   Component Value Date    WBC 9.8 08/07/2019    HGB 12.8 08/07/2019    HCT 39.9 08/07/2019    MCV 92.8 08/07/2019     08/07/2019     Lab Results   Component Value Date     (L) 03/18/2019    K 4.7 03/18/2019    CL 98 (L) 03/18/2019    CO2 22 03/18/2019    BUN 8 03/18/2019    CREATININE 0.6 03/18/2019    GLUCOSE 221 (H) 03/18/2019    CALCIUM 9.4 03/18/2019    PROT 7.4 08/07/2019    LABALBU 4.4 08/07/2019    BILITOT 0.2 08/07/2019    ALKPHOS 92 08/07/2019    AST 79 (H) 08/07/2019     (H) 08/07/2019 LABGLOM >60 03/18/2019    GFRAA >60 03/18/2019     Lab Results   Component Value Date    CHOL 198 03/18/2019    CHOL 274 (H) 10/01/2018     Lab Results   Component Value Date    TRIG 91 03/18/2019    TRIG 142 10/01/2018     Lab Results   Component Value Date    HDL 55 03/18/2019    HDL 52 10/01/2018     No results found for: LDLCALC, LDLCHOLESTEROL  Lab Results   Component Value Date    LABA1C 14.0 07/28/2022     Lab Results   Component Value Date    TSHHS 2.100 10/01/2018         /70   Pulse 95   Resp 16   Ht 5' 6\" (1.676 m)   Wt 200 lb (90.7 kg)   LMP  (LMP Unknown)   SpO2 95%   BMI 32.28 kg/m²     BP Readings from Last 3 Encounters:   07/28/22 122/70   04/22/22 106/72   01/20/22 116/80       Wt Readings from Last 3 Encounters:   07/28/22 200 lb (90.7 kg)   04/22/22 200 lb 9.6 oz (91 kg)   01/20/22 198 lb 3.2 oz (89.9 kg)         Physical Exam  Constitutional:       Appearance: Normal appearance. She is obese. She is not ill-appearing. HENT:      Head: Normocephalic and atraumatic. Right Ear: Tympanic membrane normal. There is no impacted cerumen. Left Ear: Tympanic membrane normal. There is no impacted cerumen. Nose: Nose normal.      Mouth/Throat:      Mouth: Mucous membranes are moist.      Pharynx: No oropharyngeal exudate or posterior oropharyngeal erythema. Eyes:      General: No scleral icterus. Pupils: Pupils are equal, round, and reactive to light. Cardiovascular:      Rate and Rhythm: Normal rate and regular rhythm. Pulses: Normal pulses. Heart sounds: Normal heart sounds. Pulmonary:      Effort: Pulmonary effort is normal. No respiratory distress. Breath sounds: Normal breath sounds. No wheezing or rales. Musculoskeletal:      Right hand: No swelling or tenderness. Normal range of motion. Normal strength. There is no disruption of two-point discrimination. Cervical back: Normal range of motion and neck supple.    Neurological:      General: No focal deficit present. Mental Status: She is alert and oriented to person, place, and time. Psychiatric:         Mood and Affect: Mood normal.         Behavior: Behavior normal.       ASSESSMENT/ PLAN:    1. Type 2 diabetes mellitus without complication, with long-term current use of insulin ([facility] [address]  - patient is non compliance, dose not taking her medication and dose not watch her diet. -A1c last visit was 6.8 today 14.   - POCT glycosylated hemoglobin (Hb A1C)  - needs to watch her diet, and exercise and lose wt. 2. Mixed hyperlipidemia  - stable, on lipitor, last LDL    Lab Results   Component Value Date    LDLDIRECT 160 (H) 03/18/2019       3. Anxiety and depression  - stable, on trazadone    4. Primary insomnia  - on trazadon    5. Acute non-recurrent sinusitis, unspecified location  Try:  - diphenhydrAMINE HCl (BENADRYL ALLERGY PO); Take by mouth  - doxycycline hyclate (VIBRA-TABS) 100 MG tablet; Take 1 tablet by mouth in the morning and 1 tablet before bedtime. Do all this for 7 days. Dispense: 14 tablet; Refill: 0              - All old blood work reviewed with the patient  - Appropriate prescription are addressed. - After visit summery provided. - Questions answered and patient verbalizes understanding.  - Call for any problem, questions, or concerns. Return in about 3 months (around 10/28/2022).

## 2022-07-30 PROBLEM — R31.0 GROSS HEMATURIA: Status: ACTIVE | Noted: 2022-07-30

## 2022-07-30 ASSESSMENT — ENCOUNTER SYMPTOMS
SINUS PRESSURE: 1
SINUS COMPLAINT: 1
SORE THROAT: 0
TROUBLE SWALLOWING: 0
SHORTNESS OF BREATH: 0
COUGH: 1

## 2022-08-02 RX ORDER — OMEPRAZOLE 20 MG/1
20 CAPSULE, DELAYED RELEASE ORAL DAILY
Qty: 30 CAPSULE | Refills: 3 | Status: SHIPPED | OUTPATIENT
Start: 2022-08-02 | End: 2022-10-31 | Stop reason: SDUPTHER

## 2022-10-27 DIAGNOSIS — F33.1 MODERATE EPISODE OF RECURRENT MAJOR DEPRESSIVE DISORDER (HCC): ICD-10-CM

## 2022-10-27 RX ORDER — BUPROPION HYDROCHLORIDE 150 MG/1
150 TABLET, EXTENDED RELEASE ORAL EVERY 12 HOURS
Qty: 180 TABLET | Refills: 1 | Status: SHIPPED | OUTPATIENT
Start: 2022-10-27

## 2022-10-31 RX ORDER — OMEPRAZOLE 20 MG/1
20 CAPSULE, DELAYED RELEASE ORAL DAILY
Qty: 30 CAPSULE | Refills: 3 | Status: SHIPPED | OUTPATIENT
Start: 2022-10-31

## 2022-11-16 ENCOUNTER — OFFICE VISIT (OUTPATIENT)
Dept: FAMILY MEDICINE CLINIC | Age: 42
End: 2022-11-16
Payer: COMMERCIAL

## 2022-11-16 VITALS
SYSTOLIC BLOOD PRESSURE: 126 MMHG | WEIGHT: 196.4 LBS | BODY MASS INDEX: 31.57 KG/M2 | OXYGEN SATURATION: 98 % | HEIGHT: 66 IN | DIASTOLIC BLOOD PRESSURE: 74 MMHG | HEART RATE: 101 BPM

## 2022-11-16 DIAGNOSIS — Z79.4 TYPE 2 DIABETES MELLITUS WITHOUT COMPLICATION, WITH LONG-TERM CURRENT USE OF INSULIN (HCC): Primary | ICD-10-CM

## 2022-11-16 DIAGNOSIS — F32.A ANXIETY AND DEPRESSION: ICD-10-CM

## 2022-11-16 DIAGNOSIS — F51.01 PRIMARY INSOMNIA: ICD-10-CM

## 2022-11-16 DIAGNOSIS — E11.9 TYPE 2 DIABETES MELLITUS WITHOUT COMPLICATION, WITH LONG-TERM CURRENT USE OF INSULIN (HCC): Primary | ICD-10-CM

## 2022-11-16 DIAGNOSIS — F41.9 ANXIETY AND DEPRESSION: ICD-10-CM

## 2022-11-16 DIAGNOSIS — E78.2 MIXED HYPERLIPIDEMIA: ICD-10-CM

## 2022-11-16 DIAGNOSIS — N92.6 IRREGULAR MENSTRUATION: ICD-10-CM

## 2022-11-16 PROCEDURE — 1036F TOBACCO NON-USER: CPT | Performed by: FAMILY MEDICINE

## 2022-11-16 PROCEDURE — 2022F DILAT RTA XM EVC RTNOPTHY: CPT | Performed by: FAMILY MEDICINE

## 2022-11-16 PROCEDURE — G8427 DOCREV CUR MEDS BY ELIG CLIN: HCPCS | Performed by: FAMILY MEDICINE

## 2022-11-16 PROCEDURE — 3046F HEMOGLOBIN A1C LEVEL >9.0%: CPT | Performed by: FAMILY MEDICINE

## 2022-11-16 PROCEDURE — G8417 CALC BMI ABV UP PARAM F/U: HCPCS | Performed by: FAMILY MEDICINE

## 2022-11-16 PROCEDURE — 99214 OFFICE O/P EST MOD 30 MIN: CPT | Performed by: FAMILY MEDICINE

## 2022-11-16 PROCEDURE — G8484 FLU IMMUNIZE NO ADMIN: HCPCS | Performed by: FAMILY MEDICINE

## 2022-11-16 RX ORDER — THIAMINE MONONITRATE (VIT B1) 100 MG
100 TABLET ORAL DAILY
Qty: 30 TABLET | Refills: 5 | Status: SHIPPED | OUTPATIENT
Start: 2022-11-16

## 2022-11-16 RX ORDER — LANOLIN ALCOHOL/MO/W.PET/CERES
CREAM (GRAM) TOPICAL
Qty: 30 TABLET | Refills: 5 | Status: SHIPPED | OUTPATIENT
Start: 2022-11-16

## 2022-11-16 ASSESSMENT — PATIENT HEALTH QUESTIONNAIRE - PHQ9
2. FEELING DOWN, DEPRESSED OR HOPELESS: 0
SUM OF ALL RESPONSES TO PHQ QUESTIONS 1-9: 10
8. MOVING OR SPEAKING SO SLOWLY THAT OTHER PEOPLE COULD HAVE NOTICED. OR THE OPPOSITE, BEING SO FIGETY OR RESTLESS THAT YOU HAVE BEEN MOVING AROUND A LOT MORE THAN USUAL: 1
6. FEELING BAD ABOUT YOURSELF - OR THAT YOU ARE A FAILURE OR HAVE LET YOURSELF OR YOUR FAMILY DOWN: 0
SUM OF ALL RESPONSES TO PHQ9 QUESTIONS 1 & 2: 2
SUM OF ALL RESPONSES TO PHQ QUESTIONS 1-9: 10
10. IF YOU CHECKED OFF ANY PROBLEMS, HOW DIFFICULT HAVE THESE PROBLEMS MADE IT FOR YOU TO DO YOUR WORK, TAKE CARE OF THINGS AT HOME, OR GET ALONG WITH OTHER PEOPLE: 2
4. FEELING TIRED OR HAVING LITTLE ENERGY: 3
5. POOR APPETITE OR OVEREATING: 1
3. TROUBLE FALLING OR STAYING ASLEEP: 3
SUM OF ALL RESPONSES TO PHQ QUESTIONS 1-9: 10
9. THOUGHTS THAT YOU WOULD BE BETTER OFF DEAD, OR OF HURTING YOURSELF: 0
7. TROUBLE CONCENTRATING ON THINGS, SUCH AS READING THE NEWSPAPER OR WATCHING TELEVISION: 0
1. LITTLE INTEREST OR PLEASURE IN DOING THINGS: 2
SUM OF ALL RESPONSES TO PHQ QUESTIONS 1-9: 10

## 2022-11-16 ASSESSMENT — ANXIETY QUESTIONNAIRES
5. BEING SO RESTLESS THAT IT IS HARD TO SIT STILL: 2
IF YOU CHECKED OFF ANY PROBLEMS ON THIS QUESTIONNAIRE, HOW DIFFICULT HAVE THESE PROBLEMS MADE IT FOR YOU TO DO YOUR WORK, TAKE CARE OF THINGS AT HOME, OR GET ALONG WITH OTHER PEOPLE: SOMEWHAT DIFFICULT
2. NOT BEING ABLE TO STOP OR CONTROL WORRYING: 0
GAD7 TOTAL SCORE: 8
4. TROUBLE RELAXING: 2
6. BECOMING EASILY ANNOYED OR IRRITABLE: 2
7. FEELING AFRAID AS IF SOMETHING AWFUL MIGHT HAPPEN: 0
1. FEELING NERVOUS, ANXIOUS, OR ON EDGE: 1
3. WORRYING TOO MUCH ABOUT DIFFERENT THINGS: 1

## 2022-11-16 ASSESSMENT — COLUMBIA-SUICIDE SEVERITY RATING SCALE - C-SSRS
6. HAVE YOU EVER DONE ANYTHING, STARTED TO DO ANYTHING, OR PREPARED TO DO ANYTHING TO END YOUR LIFE?: NO
2. HAVE YOU ACTUALLY HAD ANY THOUGHTS OF KILLING YOURSELF?: NO
1. WITHIN THE PAST MONTH, HAVE YOU WISHED YOU WERE DEAD OR WISHED YOU COULD GO TO SLEEP AND NOT WAKE UP?: NO

## 2022-11-16 NOTE — PROGRESS NOTES
Dolores Young  1980 11/26/22    Chief Complaint   Patient presents with    6 Month Follow-Up    Diabetes    Hyperlipidemia    Anxiety    Depression    Insomnia    Gastroesophageal Reflux    Other     Needs a referral to Gyn for irregular [facility]           Patient here for 6 months f/u regarding diabetes, hyperlipidemia, anxiety, depression, insomnia, and GERD. patient does see Dr. Kyle Patel for her diabetes. She is taking cholesterol medication with no side effect, GERD under control. Her anxiety and depression under control with the medications. Patient also c/o irregular menstrual cycle going on for few months.       Past Medical History:   Diagnosis Date    Anxiety and depression 2/17/2021    Diabetes mellitus ([address]     Headache     Hyperlipidemia      Past Surgical History:   Procedure Laterality Date    COLONOSCOPY  11/06/2019    polyp, repeat in 10 years    COLONOSCOPY N/A 11/6/2019    COLONOSCOPY POLYPECTOMY SNARE/COLD BIOPSY performed by Maliha Leyva MD at [address] [place] [address], [place], DIAGNOSTIC  11/06/2019    biopsy, f/u with aman in 2 weeks    UPPER GASTROINTESTINAL ENDOSCOPY N/A 11/6/2019    EGD BIOPSY performed by Maliha Leyva MD at [place] [address]     Family History   Problem Relation Age of Onset    Colon Cancer Neg Hx     Stomach Cancer Neg Hx      Social History     Socioeconomic History    Marital status: Single     Spouse name: Not on file    Number of children: Not on file    Years of education: Not on file    Highest education level: Not on file   Occupational History    Not on file   Tobacco Use    Smoking status: Never    Smokeless tobacco: Never   Vaping Use    Vaping Use: Never used   Substance and Sexual Activity    Alcohol use: Yes    Drug use: No     Comment: occasionally    Sexual activity: Yes     Partners: Male   Other Topics Concern    Not on file   Social History Narrative    Not on file     Social Determinants of Health     Financial Resource Strain: Unknown Difficulty of Paying Living Expenses: Patient refused   Food Insecurity: Unknown    Worried About Running Out of Food in the Last Year: Patient refused    Ran Out of Food in the Last Year: Patient refused   Transportation Needs: Not on file   Physical Activity: Not on file   Stress: Not on file   Social Connections: Not on file   Intimate Partner Violence: Not on file   Housing Stability: Not on file       No Known Allergies  Current Outpatient Medications   Medication Sig Dispense Refill    folic acid (FOLVITE) 357 MCG tablet TAKE 1 TABLET BY MOUTH EVERY DAY 30 tablet 5    vitamin B-1 (THIAMINE) 100 MG tablet Take 1 tablet by mouth daily 30 tablet 5    omeprazole (PRILOSEC) 20 MG delayed release capsule Take 1 capsule by mouth Daily 30 capsule 3    buPROPion (WELLBUTRIN SR) 150 MG extended release tablet TAKE 1 TABLET BY MOUTH EVERY 12 HOURS LAST DOSE BEFORE 6  tablet 1    diphenhydrAMINE HCl (BENADRYL ALLERGY PO) Take by mouth      naproxen (NAPROSYN) 500 MG tablet Take 1 tablet by mouth 2 times daily (with meals) 20 tablet 0    atorvastatin (LIPITOR) 20 MG tablet TAKE 1 TABLET BY MOUTH EVERY DAY 90 tablet 1    Multiple Vitamin (DAILY-NADER MULTIVITAMIN) TABS TAKE 1 TABLET BY MOUTH EVERY DAY 30 tablet 5    HUMALOG KWIKPEN 100 UNIT/ML pen       Blood Glucose Monitoring Suppl (TRUE METRIX METER) w/Device KIT 1 KIT BY DOES NOT APPLY ROUTE DAILY  0    blood glucose monitor strips Check blood glucose AC and  strip 3    Lancets MISC Check blood glucose AC and  each 3    metFORMIN (GLUCOPHAGE) 500 MG tablet Take 1 tablet by mouth 2 times daily (with meals) (Patient taking differently: Take 500 mg by mouth 5 times daily Indications: pt reports taking 2 tabs in a.m., 2 tabs in p.m.) 60 tablet 0    Insulin Pen Needle 31G X 5 MM MISC 1 each by Does not apply route daily 100 each 3    acetaminophen (TYLENOL) 500 MG tablet Take 1,000 mg by mouth every 6 hours as needed.       azithromycin (ZITHROMAX) 250 MG tablet Take 1 tablet by mouth See Admin Instructions for 5 days 500mg on day 1 followed by 250mg on days 2 - 5 6 tablet 0    UNABLE TO FIND R spica thumb splint (Patient not taking: Reported on 7/28/2022) 1 Device 0     No current facility-administered medications for this visit. Review of Systems   Constitutional:  Negative for activity change, chills, fatigue and fever. HENT:  Negative for congestion, sinus pressure, sore throat and trouble swallowing. Respiratory:  Negative for cough and shortness of breath. Cardiovascular:  Negative for chest pain and leg swelling. Genitourinary:  Positive for menstrual problem. Negative for dysuria and hematuria. Musculoskeletal:  Negative for neck pain. Neurological:  Negative for dizziness, numbness and headaches. Psychiatric/Behavioral:  Negative for agitation and behavioral problems. The patient is not nervous/anxious.       Lab Results   Component Value Date    WBC 9.8 08/07/2019    HGB 12.8 08/07/2019    HCT 39.9 08/07/2019    MCV 92.8 08/07/2019     08/07/2019     Lab Results   Component Value Date     (L) 03/18/2019    K 4.7 03/18/2019    CL 98 (L) 03/18/2019    CO2 22 03/18/2019    BUN 8 03/18/2019    CREATININE 0.6 03/18/2019    GLUCOSE 221 (H) 03/18/2019    CALCIUM 9.4 03/18/2019    PROT 7.4 08/07/2019    LABALBU 4.4 08/07/2019    BILITOT 0.2 08/07/2019    ALKPHOS 92 08/07/2019    AST 79 (H) 08/07/2019     (H) 08/07/2019    LABGLOM >60 03/18/2019    GFRAA >60 03/18/2019     Lab Results   Component Value Date    CHOL 198 03/18/2019    CHOL 274 (H) 10/01/2018     Lab Results   Component Value Date    TRIG 91 03/18/2019    TRIG 142 10/01/2018     Lab Results   Component Value Date    HDL 55 03/18/2019    HDL 52 10/01/2018     No results found for: [address], LDLCHOLESTEROL  Lab Results   Component Value Date    LABA1C 14.0 07/28/2022     Lab Results   Component Value Date    TSHHS 2.100 10/01/2018         /74 (Site: Left Upper Arm, Position: Sitting, Cuff Size: Medium Adult)   Pulse (!) 101   Ht 5' 6\" (1.676 m)   Wt 196 lb 6.4 oz (89.1 kg)   SpO2 98%   BMI 31.70 kg/m²     BP Readings from Last 3 Encounters:   11/16/22 126/74   07/28/22 122/70   04/22/22 106/72       Wt Readings from Last 3 Encounters:   11/16/22 196 lb 6.4 oz (89.1 kg)   07/28/22 200 lb (90.7 kg)   04/22/22 200 lb 9.6 oz (91 kg)         Physical Exam  Constitutional:       Appearance: Normal appearance. She is obese. She is not ill-appearing. HENT:      Head: Normocephalic and atraumatic. Eyes:      General: No scleral icterus. Pupils: Pupils are equal, round, and reactive to light. Cardiovascular:      Rate and Rhythm: Normal rate and regular rhythm. Pulses: Normal pulses. Heart sounds: Normal heart sounds. Pulmonary:      Effort: Pulmonary effort is normal.      Breath sounds: Normal breath sounds. No wheezing. Musculoskeletal:      Right hand: No swelling or tenderness. Normal range of motion. Normal strength. There is no disruption of two-point discrimination. Cervical back: Normal range of motion and neck supple. Neurological:      General: No focal deficit present. Mental Status: She is alert and oriented to person, place, and time. Psychiatric:         Mood and Affect: Mood normal.         Behavior: Behavior normal.       ASSESSMENT/ PLAN:    1. Type 2 diabetes mellitus without complication, with long-term current use of insulin (HCC)  - non compliance, f/u with Dr Omaira Turk    2. Mixed hyperlipidemia  - didn't do the blood work, non complaince, on lipitor, last  on 2019    3. Anxiety and depression  - stable, on wellbuterin    4. Primary insomnia  - stable     5. Irregular menstruation  - Helga Lal MD, Gynecology, [facility]            - All old blood work reviewed with the patient  - Appropriate prescription are addressed. - After visit summery provided.   - Questions answered and patient verbalizes understanding.  - Call for any problem, questions, or concerns.  - RTC if symptoms worse. Return in about 3 months (around 2/16/2023).

## 2022-11-23 ENCOUNTER — TELEPHONE (OUTPATIENT)
Dept: FAMILY MEDICINE CLINIC | Age: 42
End: 2022-11-23

## 2022-11-23 DIAGNOSIS — R05.1 ACUTE COUGH: Primary | ICD-10-CM

## 2022-11-23 RX ORDER — AZITHROMYCIN 250 MG/1
250 TABLET, FILM COATED ORAL SEE ADMIN INSTRUCTIONS
Qty: 6 TABLET | Refills: 0 | Status: SHIPPED | OUTPATIENT
Start: 2022-11-23 | End: 2022-11-28

## 2022-11-23 NOTE — TELEPHONE ENCOUNTER
Patient called and stated that she started having symptoms on 11/22/22,a headache, cough, chest tightness (from the cough moving into the chest), and sore throat. Wanted to know if provider could order her something for this. If so please send to [facility] [address] [place] [address] street. Took a Covid and it was negative.

## 2022-11-26 ASSESSMENT — ENCOUNTER SYMPTOMS
SORE THROAT: 0
COUGH: 0
TROUBLE SWALLOWING: 0
SHORTNESS OF BREATH: 0
SINUS PRESSURE: 0

## 2022-12-01 ENCOUNTER — TELEPHONE (OUTPATIENT)
Dept: FAMILY MEDICINE CLINIC | Age: 42
End: 2022-12-01

## 2022-12-01 NOTE — LETTER
[facility] [facility]  [address] [facility],[address] [facility]  Phone: [phone]  Fax: [phone]    Vaughn Aguilar MD        December 2, 2022     Patient: Pratima Montalvo   YOB: 1980       To Whom it May Concern:    Please excuse Rosa Natarajan from work November 27-29 2022. She may return on November 30 2022 without restrictions. If you have any questions or concerns, please don't hesitate to call.     Sincerely,         Vaughn Aguilar MD

## 2022-12-01 NOTE — TELEPHONE ENCOUNTER
Patient called and stated that she called into the office on 11/23/2022 because she had flu symptoms. She did miss work and wanted to know if you would write her a work excuse for November 27, 28 and 29th with her returning to work on November 30th. Please advise. Thank you.

## 2022-12-13 RX ORDER — LANOLIN ALCOHOL/MO/W.PET/CERES
CREAM (GRAM) TOPICAL
Qty: 30 TABLET | Refills: 5 | OUTPATIENT
Start: 2022-12-13

## 2023-01-23 ENCOUNTER — OFFICE VISIT (OUTPATIENT)
Dept: FAMILY MEDICINE CLINIC | Age: 43
End: 2023-01-23
Payer: COMMERCIAL

## 2023-01-23 VITALS
BODY MASS INDEX: 31.34 KG/M2 | WEIGHT: 195 LBS | OXYGEN SATURATION: 99 % | DIASTOLIC BLOOD PRESSURE: 82 MMHG | HEIGHT: 66 IN | SYSTOLIC BLOOD PRESSURE: 124 MMHG | HEART RATE: 108 BPM

## 2023-01-23 DIAGNOSIS — M25.511 ACUTE PAIN OF RIGHT SHOULDER: Primary | ICD-10-CM

## 2023-01-23 PROCEDURE — G8417 CALC BMI ABV UP PARAM F/U: HCPCS | Performed by: FAMILY MEDICINE

## 2023-01-23 PROCEDURE — G8484 FLU IMMUNIZE NO ADMIN: HCPCS | Performed by: FAMILY MEDICINE

## 2023-01-23 PROCEDURE — 99213 OFFICE O/P EST LOW 20 MIN: CPT | Performed by: FAMILY MEDICINE

## 2023-01-23 PROCEDURE — G8427 DOCREV CUR MEDS BY ELIG CLIN: HCPCS | Performed by: FAMILY MEDICINE

## 2023-01-23 PROCEDURE — 1036F TOBACCO NON-USER: CPT | Performed by: FAMILY MEDICINE

## 2023-01-23 RX ORDER — NAPROXEN 500 MG/1
500 TABLET ORAL 2 TIMES DAILY WITH MEALS
Qty: 20 TABLET | Refills: 0 | Status: SHIPPED | OUTPATIENT
Start: 2023-01-23

## 2023-01-23 ASSESSMENT — PATIENT HEALTH QUESTIONNAIRE - PHQ9
7. TROUBLE CONCENTRATING ON THINGS, SUCH AS READING THE NEWSPAPER OR WATCHING TELEVISION: 3
8. MOVING OR SPEAKING SO SLOWLY THAT OTHER PEOPLE COULD HAVE NOTICED. OR THE OPPOSITE, BEING SO FIGETY OR RESTLESS THAT YOU HAVE BEEN MOVING AROUND A LOT MORE THAN USUAL: 0
9. THOUGHTS THAT YOU WOULD BE BETTER OFF DEAD, OR OF HURTING YOURSELF: 0
SUM OF ALL RESPONSES TO PHQ QUESTIONS 1-9: 12
4. FEELING TIRED OR HAVING LITTLE ENERGY: 3
SUM OF ALL RESPONSES TO PHQ9 QUESTIONS 1 & 2: 1
SUM OF ALL RESPONSES TO PHQ QUESTIONS 1-9: 12
SUM OF ALL RESPONSES TO PHQ QUESTIONS 1-9: 12
2. FEELING DOWN, DEPRESSED OR HOPELESS: 0
3. TROUBLE FALLING OR STAYING ASLEEP: 3
6. FEELING BAD ABOUT YOURSELF - OR THAT YOU ARE A FAILURE OR HAVE LET YOURSELF OR YOUR FAMILY DOWN: 0
10. IF YOU CHECKED OFF ANY PROBLEMS, HOW DIFFICULT HAVE THESE PROBLEMS MADE IT FOR YOU TO DO YOUR WORK, TAKE CARE OF THINGS AT HOME, OR GET ALONG WITH OTHER PEOPLE: 1
5. POOR APPETITE OR OVEREATING: 2
1. LITTLE INTEREST OR PLEASURE IN DOING THINGS: 1
SUM OF ALL RESPONSES TO PHQ QUESTIONS 1-9: 12

## 2023-01-23 NOTE — PROGRESS NOTES
Magy Martinez  1980 01/28/23    Chief Complaint   Patient presents with    Shoulder Pain           Patient here c/o:    Shoulder Pain   The pain is present in the right shoulder. This is a new problem. The current episode started yesterday. The problem has been gradually worsening. The quality of the pain is described as aching. The pain is at a severity of 9/10. Associated symptoms include a limited range of motion, numbness, stiffness and tingling. Pertinent negatives include no fever or itching. The symptoms are aggravated by activity. She has tried nothing for the symptoms. Her past medical history is significant for diabetes.      Past Medical History:   Diagnosis Date    Anxiety and depression 2/17/2021    Diabetes mellitus ([address]     Headache     Hyperlipidemia      Past Surgical History:   Procedure Laterality Date    COLONOSCOPY  11/06/2019    polyp, repeat in 10 years    COLONOSCOPY N/A 11/6/2019    COLONOSCOPY POLYPECTOMY SNARE/COLD BIOPSY performed by Javier Holguin MD at [address] [place] [address], [place], DIAGNOSTIC  11/06/2019    biopsy, f/u with aman in 2 weeks    UPPER GASTROINTESTINAL ENDOSCOPY N/A 11/6/2019    EGD BIOPSY performed by Javier Holguin MD at [place] [address]     Family History   Problem Relation Age of Onset    Colon Cancer Neg Hx     Stomach Cancer Neg Hx      Social History     Socioeconomic History    Marital status: Single     Spouse name: Not on file    Number of children: Not on file    Years of education: Not on file    Highest education level: Not on file   Occupational History    Not on file   Tobacco Use    Smoking status: Never    Smokeless tobacco: Never   Vaping Use    Vaping Use: Never used   Substance and Sexual Activity    Alcohol use: Yes    Drug use: No     Comment: occasionally    Sexual activity: Yes     Partners: Male   Other Topics Concern    Not on file   Social History Narrative    Not on file     Social Determinants of Health     Financial Resource Strain: Unknown    Difficulty of Paying Living Expenses: Patient refused   Food Insecurity: Unknown    Worried About Running Out of Food in the Last Year: Patient refused    Ran Out of Food in the Last Year: Patient refused   Transportation Needs: Not on file   Physical Activity: Not on file   Stress: Not on file   Social Connections: Not on file   Intimate Partner Violence: Not on file   Housing Stability: Not on file       No Known Allergies  Current Outpatient Medications   Medication Sig Dispense Refill    naproxen (NAPROSYN) 500 MG tablet Take 1 tablet by mouth 2 times daily (with meals) 20 tablet 0    folic acid (FOLVITE) 617 MCG tablet TAKE 1 TABLET BY MOUTH EVERY DAY 30 tablet 5    vitamin B-1 (THIAMINE) 100 MG tablet Take 1 tablet by mouth daily 30 tablet 5    omeprazole (PRILOSEC) 20 MG delayed release capsule Take 1 capsule by mouth Daily 30 capsule 3    buPROPion (WELLBUTRIN SR) 150 MG extended release tablet TAKE 1 TABLET BY MOUTH EVERY 12 HOURS LAST DOSE BEFORE 6  tablet 1    diphenhydrAMINE HCl (BENADRYL ALLERGY PO) Take by mouth      naproxen (NAPROSYN) 500 MG tablet Take 1 tablet by mouth 2 times daily (with meals) 20 tablet 0    atorvastatin (LIPITOR) 20 MG tablet TAKE 1 TABLET BY MOUTH EVERY DAY 90 tablet 1    Multiple Vitamin (DAILY-NADER MULTIVITAMIN) TABS TAKE 1 TABLET BY MOUTH EVERY DAY 30 tablet 5    HUMALOG KWIKPEN 100 UNIT/ML pen       Blood Glucose Monitoring Suppl (TRUE METRIX METER) w/Device KIT 1 KIT BY DOES NOT APPLY ROUTE DAILY  0    blood glucose monitor strips Check blood glucose AC and  strip 3    Lancets MISC Check blood glucose AC and  each 3    metFORMIN (GLUCOPHAGE) 500 MG tablet Take 1 tablet by mouth 2 times daily (with meals) (Patient taking differently: Take 500 mg by mouth 5 times daily Indications: pt reports taking 2 tabs in a.m., 2 tabs in p.m.) 60 tablet 0    Insulin Pen Needle 31G X 5 MM MISC 1 each by Does not apply route daily 100 each 3 acetaminophen (TYLENOL) 500 MG tablet Take 1,000 mg by mouth every 6 hours as needed. UNABLE TO FIND R spica thumb splint (Patient not taking: No sig reported) 1 Device 0     No current facility-administered medications for this visit. Review of Systems   Constitutional:  Positive for activity change. Negative for appetite change and fever. Respiratory:  Negative for cough and shortness of breath. Cardiovascular:  Negative for chest pain. Musculoskeletal:  Positive for arthralgias (R shoulder pain) and stiffness. Skin:  Negative for itching. Neurological:  Positive for tingling and numbness.      Lab Results   Component Value Date    WBC 9.8 08/07/2019    HGB 12.8 08/07/2019    HCT 39.9 08/07/2019    MCV 92.8 08/07/2019     08/07/2019     Lab Results   Component Value Date     (L) 03/18/2019    K 4.7 03/18/2019    CL 98 (L) 03/18/2019    CO2 22 03/18/2019    BUN 8 03/18/2019    CREATININE 0.6 03/18/2019    GLUCOSE 221 (H) 03/18/2019    CALCIUM 9.4 03/18/2019    PROT 7.4 08/07/2019    LABALBU 4.4 08/07/2019    BILITOT 0.2 08/07/2019    ALKPHOS 92 08/07/2019    AST 79 (H) 08/07/2019     (H) 08/07/2019    LABGLOM >60 03/18/2019    GFRAA >60 03/18/2019     Lab Results   Component Value Date    CHOL 198 03/18/2019    CHOL 274 (H) 10/01/2018     Lab Results   Component Value Date    TRIG 91 03/18/2019    TRIG 142 10/01/2018     Lab Results   Component Value Date    HDL 55 03/18/2019    HDL 52 10/01/2018     No results found for: LDLCALC, LDLCHOLESTEROL  Lab Results   Component Value Date    LABA1C 14.0 07/28/2022     Lab Results   Component Value Date    TSHHS 2.100 10/01/2018         /82 (Site: Left Upper Arm, Position: Sitting, Cuff Size: Medium Adult)   Pulse (!) 108   Ht 5' 6\" (1.676 m)   Wt 195 lb (88.5 kg)   SpO2 99%   BMI 31.47 kg/m²     BP Readings from Last 3 Encounters:   01/23/23 124/82   11/16/22 126/74   07/28/22 122/70       Wt Readings from Last 3 Encounters:   01/23/23 195 lb (88.5 kg)   11/16/22 196 lb 6.4 oz (89.1 kg)   07/28/22 200 lb (90.7 kg)         Physical Exam  Constitutional:       Appearance: Normal appearance. She is not ill-appearing. Cardiovascular:      Rate and Rhythm: Normal rate and regular rhythm. Pulses: Normal pulses. Heart sounds: Normal heart sounds. Pulmonary:      Effort: Pulmonary effort is normal.      Breath sounds: No wheezing. Musculoskeletal:      Right shoulder: Tenderness present. No swelling. Decreased range of motion. Normal strength. Cervical back: Normal range of motion and neck supple. Neurological:      Mental Status: She is alert and oriented to person, place, and time. Psychiatric:         Mood and Affect: Mood normal.         Behavior: Behavior normal.       ASSESSMENT/ PLAN:    1. Acute pain of right shoulder  - will try:  - naproxen (NAPROSYN) 500 MG tablet; Take 1 tablet by mouth 2 times daily (with meals)  Dispense: 20 tablet; Refill: 0            - All old blood work reviewed with the patient  - Appropriate prescription are addressed. - After visit summery provided. - Questions answered and patient verbalizes understanding.  - Call for any problem, questions, or concerns. Return if symptoms worsen or fail to improve.

## 2023-01-23 NOTE — LETTER
[facility]  [address] [facility] [address] [facility]  Phone: [phone]  Fax: [phone]    Nidhi Rosado MD        January 23, 2023     Patient: Norma Tian   YOB: 1980   Date of Visit: 1/23/2023       To Whom it May Concern:    Kenzie Perez was seen in my clinic on 1/23/2023. She may return to work on 1/25/2023. If you have any questions or concerns, please don't hesitate to call.     Sincerely,         Nidhi Rosado MD

## 2023-01-23 NOTE — LETTER
[facility]  [address] [facility],[address] [facility]  Phone: [phone]  Fax: [phone]    Radha Howell MD        January 23, 2023     Patient: Merline Beecham   YOB: 1980   Date of Visit: 1/23/2023       To Whom it May Concern:    Ricki Urbina was seen in my clinic on 1/23/2023. She may return to work on 1/24/2023. If you have any questions or concerns, please don't hesitate to call.     Sincerely,         Radha Howell MD

## 2023-01-28 ASSESSMENT — ENCOUNTER SYMPTOMS
SHORTNESS OF BREATH: 0
COUGH: 0

## 2023-02-07 RX ORDER — OMEPRAZOLE 20 MG/1
20 CAPSULE, DELAYED RELEASE ORAL DAILY
Qty: 30 CAPSULE | Refills: 3 | Status: SHIPPED | OUTPATIENT
Start: 2023-02-07

## 2023-02-16 ENCOUNTER — TELEPHONE (OUTPATIENT)
Dept: FAMILY MEDICINE CLINIC | Age: 43
End: 2023-02-16

## 2023-02-16 NOTE — TELEPHONE ENCOUNTER
Spoke to provider and she requested to have patient dismissed for non compliance. Patient was advised that she needed to have labs completed before her next appointment on 2/16/23, she had not completed them. Letter is generated and waiting for provider to sign.

## 2023-02-17 DIAGNOSIS — F33.1 MODERATE EPISODE OF RECURRENT MAJOR DEPRESSIVE DISORDER (HCC): ICD-10-CM

## 2023-02-17 RX ORDER — BUPROPION HYDROCHLORIDE 150 MG/1
150 TABLET, EXTENDED RELEASE ORAL EVERY 12 HOURS
Qty: 180 TABLET | Refills: 1 | OUTPATIENT
Start: 2023-02-17

## 2023-02-17 RX ORDER — DEXTRIN 3 G/3.8 G
POWDER (GRAM) ORAL
Qty: 90 TABLET | Refills: 1 | OUTPATIENT
Start: 2023-02-17

## 2023-03-18 DIAGNOSIS — F51.01 PRIMARY INSOMNIA: ICD-10-CM

## 2023-03-18 RX ORDER — TRAZODONE HYDROCHLORIDE 50 MG/1
TABLET ORAL
Qty: 90 TABLET | Refills: 1 | OUTPATIENT
Start: 2023-03-18

## 2023-05-26 RX ORDER — LANOLIN ALCOHOL/MO/W.PET/CERES
CREAM (GRAM) TOPICAL
Qty: 90 TABLET | Refills: 1 | OUTPATIENT
Start: 2023-05-26

## 2024-04-03 RX ORDER — OMEPRAZOLE 20 MG/1
20 CAPSULE, DELAYED RELEASE ORAL DAILY
Qty: 30 CAPSULE | Refills: 3 | Status: SHIPPED | OUTPATIENT
Start: 2024-04-03

## (undated) DEVICE — BW-412T DISP COMBO CLEANING BRUSH: Brand: SINGLE USE COMBINATION CLEANING BRUSH

## (undated) DEVICE — TUBING, SUCTION, 3/16" X 6', STRAIGHT: Brand: MEDLINE

## (undated) DEVICE — LINER SUCT CANSTR 1500CC SEMI RIG W/ POR HYDROPHOBIC SHUT

## (undated) DEVICE — DEFENDO AIR WATER SUCTION AND BIOPSY VALVE KIT FOR  OLYMPUS: Brand: DEFENDO AIR/WATER/SUCTION AND BIOPSY VALVE

## (undated) DEVICE — LINE SAMP O2 6.5FT W/FEMALE CONN F/ADULT CAPNOLINE PLUS

## (undated) DEVICE — THE TORRENT IRRIGATION SCOPE CONNECTOR IS USED WITH THE TORRENT IRRIGATION TUBING TO PROVIDE IRRIGATION FLUIDS SUCH AS STERILE WATER DURING GASTROINTESTINAL ENDOSCOPIC PROCEDURES WHEN USED IN CONJUNCTION WITH AN IRRIGATION PUMP (OR ELECTROSURGICAL UNIT).: Brand: TORRENT

## (undated) DEVICE — JELLY LUBRICATING 3 GM BACTERIOSTATIC

## (undated) DEVICE — KENDALL 500 SERIES DIAPHORETIC FOAM MONITORING ELECTRODE - TEAR DROP SHAPE ( 30/PK): Brand: KENDALL

## (undated) DEVICE — FORCEPS BX L240CM JAW DIA2.8MM L CAP W/ NDL MIC MESH TOOTH